# Patient Record
Sex: FEMALE | Race: ASIAN | NOT HISPANIC OR LATINO | Employment: OTHER | URBAN - METROPOLITAN AREA
[De-identification: names, ages, dates, MRNs, and addresses within clinical notes are randomized per-mention and may not be internally consistent; named-entity substitution may affect disease eponyms.]

---

## 2017-01-05 ENCOUNTER — TELEPHONE (OUTPATIENT)
Dept: PAIN MEDICINE | Facility: CLINIC | Age: 77
End: 2017-01-05

## 2017-01-06 ENCOUNTER — SURGERY (OUTPATIENT)
Age: 77
End: 2017-01-06

## 2017-01-06 ENCOUNTER — HOSPITAL ENCOUNTER (OUTPATIENT)
Dept: RADIOLOGY | Facility: HOSPITAL | Age: 77
Discharge: HOME | End: 2017-01-06
Attending: ANESTHESIOLOGY
Payer: MEDICARE

## 2017-01-06 DIAGNOSIS — M47.817 SPONDYLOSIS OF LUMBOSACRAL REGION WITHOUT MYELOPATHY OR RADICULOPATHY: ICD-10-CM

## 2017-01-06 PROCEDURE — 64495 INJ PARAVERT F JNT L/S 3 LEV: CPT | Mod: 50,TC

## 2017-01-06 PROCEDURE — 64494 INJ PARAVERT F JNT L/S 2 LEV: CPT | Mod: 50,TC

## 2017-01-06 PROCEDURE — 64493 INJ PARAVERT F JNT L/S 1 LEV: CPT | Mod: 50,TC

## 2017-01-06 RX ADMIN — LIDOCAINE HYDROCHLORIDE 10 ML: 20 INJECTION, SOLUTION INFILTRATION; PERINEURAL at 08:01

## 2017-01-06 RX ADMIN — METHYLPREDNISOLONE ACETATE 80 MG: 80 INJECTION, SUSPENSION INTRA-ARTICULAR; INTRALESIONAL; INTRAMUSCULAR; SOFT TISSUE at 08:01

## 2017-06-26 ENCOUNTER — OFFICE VISIT (OUTPATIENT)
Dept: INTERNAL MEDICINE | Facility: CLINIC | Age: 77
End: 2017-06-26
Payer: MEDICARE

## 2017-06-26 VITALS
SYSTOLIC BLOOD PRESSURE: 132 MMHG | DIASTOLIC BLOOD PRESSURE: 74 MMHG | BODY MASS INDEX: 23.19 KG/M2 | WEIGHT: 126 LBS | TEMPERATURE: 98 F | HEIGHT: 62 IN | OXYGEN SATURATION: 98 % | HEART RATE: 80 BPM

## 2017-06-26 DIAGNOSIS — R53.83 FATIGUE, UNSPECIFIED TYPE: ICD-10-CM

## 2017-06-26 DIAGNOSIS — J30.9 ALLERGIC RHINITIS, UNSPECIFIED ALLERGIC RHINITIS TRIGGER, UNSPECIFIED RHINITIS SEASONALITY: ICD-10-CM

## 2017-06-26 DIAGNOSIS — B34.9 VIRAL SYNDROME: Primary | ICD-10-CM

## 2017-06-26 LAB
ANION GAP SERPL CALC-SCNC: 7 MMOL/L
BASOPHILS # BLD AUTO: 0.02 K/UL
BASOPHILS NFR BLD: 0.5 %
BUN SERPL-MCNC: 20 MG/DL
CALCIUM SERPL-MCNC: 9.5 MG/DL
CHLORIDE SERPL-SCNC: 100 MMOL/L
CO2 SERPL-SCNC: 26 MMOL/L
CREAT SERPL-MCNC: 0.8 MG/DL
DIFFERENTIAL METHOD: ABNORMAL
EOSINOPHIL # BLD AUTO: 0.2 K/UL
EOSINOPHIL NFR BLD: 3.9 %
ERYTHROCYTE [DISTWIDTH] IN BLOOD BY AUTOMATED COUNT: 12.5 %
EST. GFR  (AFRICAN AMERICAN): >60 ML/MIN/1.73 M^2
EST. GFR  (NON AFRICAN AMERICAN): >60 ML/MIN/1.73 M^2
GLUCOSE SERPL-MCNC: 115 MG/DL
HCT VFR BLD AUTO: 35.2 %
HGB BLD-MCNC: 11.5 G/DL
LYMPHOCYTES # BLD AUTO: 1.3 K/UL
LYMPHOCYTES NFR BLD: 28.8 %
MCH RBC QN AUTO: 30.7 PG
MCHC RBC AUTO-ENTMCNC: 32.7 %
MCV RBC AUTO: 94 FL
MONOCYTES # BLD AUTO: 1 K/UL
MONOCYTES NFR BLD: 22 %
NEUTROPHILS # BLD AUTO: 2 K/UL
NEUTROPHILS NFR BLD: 44.6 %
PLATELET # BLD AUTO: 245 K/UL
PMV BLD AUTO: 9.7 FL
POTASSIUM SERPL-SCNC: 5 MMOL/L
RBC # BLD AUTO: 3.75 M/UL
SODIUM SERPL-SCNC: 133 MMOL/L
WBC # BLD AUTO: 4.37 K/UL

## 2017-06-26 PROCEDURE — 99213 OFFICE O/P EST LOW 20 MIN: CPT | Mod: S$PBB,,, | Performed by: FAMILY MEDICINE

## 2017-06-26 PROCEDURE — 1125F AMNT PAIN NOTED PAIN PRSNT: CPT | Mod: ,,, | Performed by: FAMILY MEDICINE

## 2017-06-26 PROCEDURE — 99999 PR PBB SHADOW E&M-EST. PATIENT-LVL IV: CPT | Mod: PBBFAC,,, | Performed by: FAMILY MEDICINE

## 2017-06-26 PROCEDURE — 1159F MED LIST DOCD IN RCRD: CPT | Mod: ,,, | Performed by: FAMILY MEDICINE

## 2017-06-26 PROCEDURE — 99214 OFFICE O/P EST MOD 30 MIN: CPT | Mod: PBBFAC,PO | Performed by: FAMILY MEDICINE

## 2017-06-26 PROCEDURE — 80048 BASIC METABOLIC PNL TOTAL CA: CPT

## 2017-06-26 PROCEDURE — 85025 COMPLETE CBC W/AUTO DIFF WBC: CPT

## 2017-06-26 RX ORDER — VALSARTAN AND HYDROCHLOROTHIAZIDE 160; 12.5 MG/1; MG/1
1 TABLET, FILM COATED ORAL DAILY
COMMUNITY
Start: 2017-06-20 | End: 2018-01-09 | Stop reason: SINTOL

## 2017-06-26 RX ORDER — OMEPRAZOLE 20 MG/1
20 CAPSULE, DELAYED RELEASE ORAL DAILY
COMMUNITY
Start: 2017-06-09 | End: 2018-04-02

## 2017-06-26 RX ORDER — FLUTICASONE PROPIONATE 50 MCG
2 SPRAY, SUSPENSION (ML) NASAL DAILY
Qty: 1 BOTTLE | Refills: 5 | Status: SHIPPED | OUTPATIENT
Start: 2017-06-26

## 2017-06-26 RX ORDER — LOVASTATIN 10 MG/1
10 TABLET ORAL NIGHTLY
COMMUNITY
Start: 2017-04-11

## 2017-06-26 RX ORDER — MELOXICAM 7.5 MG/1
7.5 TABLET ORAL
COMMUNITY
Start: 2017-06-09

## 2017-06-26 RX ORDER — CIPROFLOXACIN 500 MG/1
TABLET ORAL
COMMUNITY
Start: 2017-06-15 | End: 2017-12-04 | Stop reason: ALTCHOICE

## 2017-06-26 NOTE — PATIENT INSTRUCTIONS
"  H?i Ch?ng Do Vi Rút (Ng??i L?n)  M?t c?n b?nh do vi rút có th? gây ra m?t s? tri?u ch?ng. Các tri?u ch?ng ph? thu?c vào ph?n nào c?a c? th? b? ?nh h??ng b?i vi rút. N?u nó gây ?nh h??ng t?i m?i, h?ng, và ph?i, nó có th? gây ho, ?au h?ng, ngh?t m?i, và ?ôi khi nh?c ??u. N?u nó ?nh h??ng t?i donna t? và ???ng ru?t, nó có th? gây ói m?a và tiêu ch?y. ?ôi khi nó gây ra các tri?u ch?ng m? h? nh? "?au nh?c toàn thân," c?m th?y m?t m?i, không mu?n ?n u?ng gì, ho?c lên c?n s?t.  B?nh do vi rút th??ng kéo dài t? 1 t?i 2 tu?n, nh?ng ?ôi khi lâu h?n. Marli m?t s? tr??ng h?p, rober?m trùng nghiêm tr?ng h?n có th? gi?ng nh? m?t h?i ch?ng do vi rút marli m?t vài ngày ??u c?a c?n b?nh. Quý v? có th? c?n ?i khám l?i và làm thêm các th? albert?m ?? bi?t s? khác bi?t. Sidney dõi các d?u hi?u c?nh báo nh? ???c nêu d??i ?ây.  Ch?m sóc t?i yovanny  Làm sidney các h??ng d?n indiana ?ây ?? t? ch?m sóc cho b?n thân quý v? t?i nhà:  · N?u các tri?u ch?ng b? nghiêm tr?ng, hãy ngh? ng?i t?i nhà marli t? 2 t?i 3 ngày ??u.  · Tránh xa khói thu?c lá - c? khói thu?c lá c?a quý v? và khói thu?c lá c?a nh?ng ng??i khác.  · Quý v? có th? dùng thu?c paul t? do ngoài qu?y nh? acetaminophen ho?c ibuprofen ?? tr? c?n s?t, ?au nh?c b?p th?t, và nh?c ??u, tr? khi m?t lo?i thu?c khác ?ã ???c kê toa cho nh?ng ch?ng này. N?u quý v? b? b?nh meliza ho?c th?n mãn tính ho?c ?ã t?ng b? loét donna t? ho?c ch?y máu marli ???ng tiêu hoá, hãy bàn v?i bác s? c?a quý v? tr??c khi dùng các thu?c này. Không ng??i nào d??i 18 tu?i và b? b?nh lên c?n s?t ???c dùng aspirin. Nó có th? gây ra m?t c?n b?nh nghiêm tr?ng ho?c t?n h?i meliza ch?t ng??i.  · S? thèm ?n c?a quý v? có th? kém ?i, vì th? m?t ch? ?? ?n nh? c?ng t?t. Tránh ?? b? háo n??c b?ng cách u?ng t? 8 t?i 12 ly 8 ao s? ch?t l?ng m?i ngày. Ch?t l?ng này có th? donna g?m; n??c cam; n??c placido; táo, nho, và n??c ép qu? nam vi?t qu?t; n??c trái cây marli ramos?t; n??c u?ng thay cho ch?t ?i?n phân và n??c u?ng th? rio; và trà và cà phê ?ã ???c kh? " ch?t cà phê in. N?u quý v? ?ã ???c ch?n ?oán là b? b?nh th?n, hãy h?i bác s? c?a mình xem donna nhiêu ch?t l?ng và lo?i ch?t l?ng nào mà quý v? nên u?ng ?? ng?n ng?a ch?ng háo n??c. N?u quý v? b? b?nh th?n, vi?c u?ng quá rober?u ch?t l?ng có th? khi?n cho nó tích t? marli c? th? c?a quý v? và nguy hi?m t?i s?c kody? c?a mình.  · Các ph??ng thu?c paul t? do ngoài qu?y không rút ng?n ???c kody?ng th?i kristen c?a c?n b?nh nh?ng có th? h?u ích cho ch?ng ho, ?au c? h?ng; và ngh?t m?i và xoang m?i. Không dùng thu?c ch?ng ngh?t m?i n?u quý v? b? áp mary?t montgomery.  Ch?m sóc byron dõi  Khám byron dõi v?i nhân viên y t? c?a quý v? n?u quý v? không ?? h?n marli tu?n l? k? ti?p.  Khi nào ?i tìm s? khuyên nh? v? y t?  G?i nhân viên y t? ngay n?u quý v? b? b?t c? nh?ng ?i?u nào indiana ?ây:  · Ho có th?t rober?u ??m (ch?t nhày) có màu ho?c máu marli ??m  · ?au ng?c, th? d?c, th? khò khè, ho?c khó th?  · Nh?c ??u n?ng, ?au n?i m?t, c?, ho?c jose  · ?au nghiêm tr?ng, th??ng xuyên ? vùng b?ng bên ph?i phía d??i (b?ng)  · Ói m?a liên t?c (không th? gi? cho ch?t l?ng kh?i trào ra ???c)  · Tiêu ch?y th??ng xuyên (rober?u h?n 5 l?n m?t ngày); tiêu ch?y có máu (màu ?? ho?c ?en) ho?c ch?t nhày marli ?ó  · C?m th?y y?u s?c, chóng m?t, ho?c gi?ng nh? quý v? mu?n x?u  · Khát n??c cùng c?c  · S?t t? 100.4°F (38°C) tr? lên, ho?c byron ch? d?n c?a nhân viên y t?  G?i s? 911  ?i tìm s? ch?m sóc y t? kh?n c?p cho b?t c? ?i?u nào indiana ?ây x?y ra:  · Co gi?t  · C?m th?y y?u s?c, chóng m?t, ho?c gi?ng nh? quý v? mu?n x?u  · ?au ng?c, th? d?c, th? khò khè, ho?c khó th?  Date Last Reviewed: 9/25/2015  © 8561-9005 [email or web address]. [address] [place] [address], [place] [address]. All rights reserved. This information is not intended as a substitute for professional medical care. Always follow your healthcare professional's instructions.

## 2017-06-26 NOTE — PROGRESS NOTES
Subjective:       Patient ID: Carol Arias is a 77 y.o. female.    Chief Complaint: Sore Throat and Generalized Body Aches    She is here with her daughter Delphine who acts as . She has been feeling bad for a week.  She has had a series of symptoms including sore throat.  However, she is here not because of a sore throat but because she feels weak and achy all over.  She does care for a grandchild who did have some kind of virus a week ago.  She is treated for DM, HTN, HLD by Dr EDWARDO Montenegro.      Sore Throat    This is a new problem. The current episode started in the past 7 days. The problem has been waxing and waning. There has been no fever. The pain is at a severity of 6/10. The pain is moderate. Associated symptoms include congestion, coughing (little bit) and headaches. Pertinent negatives include no diarrhea, ear pain, shortness of breath, trouble swallowing or vomiting. Associated symptoms comments: Sneezing, tired, aching. She has had no exposure to strep or mono. She has tried acetaminophen for the symptoms.     Review of Systems   HENT: Positive for congestion and sore throat. Negative for ear pain and trouble swallowing.    Respiratory: Positive for cough (little bit). Negative for shortness of breath.    Gastrointestinal: Negative for diarrhea and vomiting.   Neurological: Positive for headaches.       Objective:      Physical Exam   Constitutional: She is oriented to person, place, and time. She appears well-developed and well-nourished.   HENT:   Head: Normocephalic and atraumatic.   Right Ear: Tympanic membrane, external ear and ear canal normal.   Left Ear: Tympanic membrane, external ear and ear canal normal.   Nose: Nose normal.   Mouth/Throat: Oropharynx is clear and moist. No oropharyngeal exudate.   Eyes: Conjunctivae and EOM are normal.   Neck: Neck supple. No thyromegaly present.   Cardiovascular: Normal rate, regular rhythm and normal heart sounds.    Pulmonary/Chest: Effort normal and  breath sounds normal.   Lymphadenopathy:     She has no cervical adenopathy.   Neurological: She is alert and oriented to person, place, and time.   Skin: Skin is warm and dry.   Psychiatric: She has a normal mood and affect. Her behavior is normal.         Assessment/Plan:     1. Viral syndrome     2. Allergic rhinitis, unspecified allergic rhinitis trigger, unspecified rhinitis seasonality  fluticasone (FLONASE) 50 mcg/actuation nasal spray   3. Fatigue, unspecified type  CBC auto differential    Basic metabolic panel

## 2017-12-04 ENCOUNTER — OFFICE VISIT (OUTPATIENT)
Dept: INTERNAL MEDICINE | Facility: CLINIC | Age: 77
End: 2017-12-04
Payer: MEDICARE

## 2017-12-04 ENCOUNTER — APPOINTMENT (OUTPATIENT)
Dept: RADIOLOGY | Facility: HOSPITAL | Age: 77
End: 2017-12-04
Attending: FAMILY MEDICINE
Payer: MEDICARE

## 2017-12-04 VITALS
HEIGHT: 63 IN | SYSTOLIC BLOOD PRESSURE: 130 MMHG | OXYGEN SATURATION: 99 % | HEART RATE: 74 BPM | BODY MASS INDEX: 21.21 KG/M2 | TEMPERATURE: 98 F | WEIGHT: 119.69 LBS | DIASTOLIC BLOOD PRESSURE: 71 MMHG

## 2017-12-04 DIAGNOSIS — R05.3 PERSISTENT COUGH FOR 3 WEEKS OR LONGER: ICD-10-CM

## 2017-12-04 DIAGNOSIS — R05.3 PERSISTENT COUGH FOR 3 WEEKS OR LONGER: Primary | ICD-10-CM

## 2017-12-04 PROCEDURE — 99213 OFFICE O/P EST LOW 20 MIN: CPT | Mod: PBBFAC,PO | Performed by: FAMILY MEDICINE

## 2017-12-04 PROCEDURE — 99999 PR PBB SHADOW E&M-EST. PATIENT-LVL III: CPT | Mod: PBBFAC,,, | Performed by: FAMILY MEDICINE

## 2017-12-04 PROCEDURE — 71020 XR CHEST PA AND LATERAL: CPT | Mod: TC,PO

## 2017-12-04 PROCEDURE — 99213 OFFICE O/P EST LOW 20 MIN: CPT | Mod: S$PBB,,, | Performed by: FAMILY MEDICINE

## 2017-12-04 PROCEDURE — 71020 XR CHEST PA AND LATERAL: CPT | Mod: 26,,, | Performed by: RADIOLOGY

## 2017-12-04 RX ORDER — PREDNISONE 10 MG/1
10 TABLET ORAL
COMMUNITY
End: 2017-12-19

## 2017-12-04 RX ORDER — PROMETHAZINE HYDROCHLORIDE AND CODEINE PHOSPHATE 6.25; 1 MG/5ML; MG/5ML
5 SOLUTION ORAL EVERY 4 HOURS PRN
COMMUNITY
End: 2017-12-19

## 2017-12-04 RX ORDER — BENZONATATE 200 MG/1
200 CAPSULE ORAL 3 TIMES DAILY PRN
Qty: 30 CAPSULE | Refills: 0 | Status: SHIPPED | OUTPATIENT
Start: 2017-12-04 | End: 2018-04-02

## 2017-12-04 RX ORDER — LEVOFLOXACIN 500 MG/1
TABLET, FILM COATED ORAL
COMMUNITY
Start: 2017-10-04 | End: 2018-04-02 | Stop reason: ALTCHOICE

## 2017-12-04 RX ORDER — AZITHROMYCIN 250 MG/1
TABLET, FILM COATED ORAL
COMMUNITY
Start: 2017-11-13 | End: 2017-12-19

## 2017-12-04 RX ORDER — ALBUTEROL SULFATE 90 UG/1
AEROSOL, METERED RESPIRATORY (INHALATION)
COMMUNITY
Start: 2017-11-13 | End: 2018-04-02 | Stop reason: SDUPTHER

## 2017-12-04 RX ORDER — BENZONATATE 200 MG/1
200 CAPSULE ORAL 3 TIMES DAILY PRN
COMMUNITY
End: 2017-12-04 | Stop reason: SDUPTHER

## 2017-12-04 NOTE — PATIENT INSTRUCTIONS
Use albuterol 3 x day - every 4-6 hours.  Take the antibiotic azithromycin - it will cover pertussis (whooping cough)  Finish the prednisone course.      Step-by-Step  Using an Inhaler Without a Spacer       Date Last Reviewed: 2/1/2017  © 1689-6015 [email or web address]. [address] [place] [id number]. All rights reserved. This information is not intended as a substitute for professional medical care. Always follow your healthcare professional's instructions.

## 2017-12-04 NOTE — PROGRESS NOTES
Subjective:       Patient ID: Carol Arias is a 77 y.o. female.    Chief Complaint: Cough and Fatigue    She has been coughing for 6 weeks - she saw doctor twice - initially given a cough syrup. Then saw him again 11/13/17. She was given tessalon perles, phen/codeine, albuterol, flonase and prednisone 10 mg #20 one daily - still taking this. Here with daughter who sts she felt pt was febrile 3 days ago whren she checked on her - no CXR or labs performed. Last labs 3-4 months ago for DM per son whom I spoke with by phone. He also sts she was rxd Zpack but did not take. All other meds being taken.      Review of Systems   Constitutional: Negative for fever.   Respiratory: Positive for cough, chest tightness and shortness of breath (with cough only).    Cardiovascular: Positive for chest pain (with cough spasms).       Objective:      Physical Exam   Constitutional: She is oriented to person, place, and time. She appears well-developed and well-nourished.   HENT:   Head: Normocephalic and atraumatic.   Right Ear: Tympanic membrane, external ear and ear canal normal.   Left Ear: Tympanic membrane, external ear and ear canal normal.   Nose: Nose normal.   Mouth/Throat: Oropharynx is clear and moist. No oropharyngeal exudate.   Eyes: Conjunctivae and EOM are normal.   Neck: Neck supple. No thyromegaly present.   Cardiovascular: Normal rate, regular rhythm and normal heart sounds.    Pulmonary/Chest: Effort normal and breath sounds normal.   Lymphadenopathy:     She has no cervical adenopathy.   Neurological: She is alert and oriented to person, place, and time.   Skin: Skin is warm and dry.   Psychiatric: She has a normal mood and affect. Her behavior is normal.         Assessment/Plan:     1. Persistent cough for 3 weeks or longer  X-Ray Chest PA And Lateral    benzonatate (TESSALON) 200 MG capsule   she is to take the Zpack - will check xray. Continue flonase and albuterol as directed, finish pred course.  Pulmonary if  no improvement.  Reviewed MDI delivery with pt / daughter.

## 2017-12-05 ENCOUNTER — TELEPHONE (OUTPATIENT)
Dept: INTERNAL MEDICINE | Facility: CLINIC | Age: 77
End: 2017-12-05

## 2017-12-05 DIAGNOSIS — J18.9 PNEUMONIA OF LEFT LOWER LOBE DUE TO INFECTIOUS ORGANISM: Primary | ICD-10-CM

## 2017-12-05 RX ORDER — AMOXICILLIN 500 MG/1
1000 CAPSULE ORAL EVERY 12 HOURS
Qty: 20 CAPSULE | Refills: 0 | Status: SHIPPED | OUTPATIENT
Start: 2017-12-05 | End: 2017-12-11

## 2017-12-05 RX ORDER — DOXYCYCLINE 100 MG/1
100 CAPSULE ORAL 2 TIMES DAILY
Qty: 14 CAPSULE | Refills: 0 | Status: SHIPPED | OUTPATIENT
Start: 2017-12-05 | End: 2017-12-11 | Stop reason: SDUPTHER

## 2017-12-05 NOTE — TELEPHONE ENCOUNTER
Son told me on the phone that she had a Z-Brant but had not taken it.  I am asking her to take it.  I did not call in any abx because I understood that she had not taken the Zpack yet.    Pls speak with the son to clarify. If she has already taken the Zpack rxd at her last visit with the other doctor, then I will send a different abx.

## 2017-12-05 NOTE — TELEPHONE ENCOUNTER
Spoke with the son - she was rxd Zpack 11/13 and did not take it right away but she did take it last week and completed it. She also had a course of cipro rxd in the past and took it more recently as well - not sure when however.    Sending abs now - informed son - rx for dual therapy 2/2 having failed course of Zpack. Son informed to be taken concurrently.

## 2017-12-05 NOTE — TELEPHONE ENCOUNTER
See chest x-ray results.  Let son know there is evidence of pneumonia.  She is to take her medication. Pls schedule her for a one-week recheck.

## 2017-12-05 NOTE — TELEPHONE ENCOUNTER
Please explain which medication she is to continue.  Son states that she already had a z-pack   Was a new on called in

## 2017-12-11 ENCOUNTER — TELEPHONE (OUTPATIENT)
Dept: INTERNAL MEDICINE | Facility: CLINIC | Age: 77
End: 2017-12-11

## 2017-12-11 ENCOUNTER — OFFICE VISIT (OUTPATIENT)
Dept: INTERNAL MEDICINE | Facility: CLINIC | Age: 77
End: 2017-12-11
Payer: MEDICARE

## 2017-12-11 VITALS
OXYGEN SATURATION: 99 % | WEIGHT: 125.63 LBS | SYSTOLIC BLOOD PRESSURE: 139 MMHG | TEMPERATURE: 98 F | HEIGHT: 62 IN | HEART RATE: 73 BPM | BODY MASS INDEX: 23.12 KG/M2 | DIASTOLIC BLOOD PRESSURE: 77 MMHG

## 2017-12-11 DIAGNOSIS — J18.9 PNEUMONIA OF LEFT LOWER LOBE DUE TO INFECTIOUS ORGANISM: Primary | ICD-10-CM

## 2017-12-11 PROCEDURE — 99213 OFFICE O/P EST LOW 20 MIN: CPT | Mod: PBBFAC,PO | Performed by: FAMILY MEDICINE

## 2017-12-11 PROCEDURE — 99999 PR PBB SHADOW E&M-EST. PATIENT-LVL III: CPT | Mod: PBBFAC,,, | Performed by: FAMILY MEDICINE

## 2017-12-11 PROCEDURE — 99213 OFFICE O/P EST LOW 20 MIN: CPT | Mod: S$PBB,,, | Performed by: FAMILY MEDICINE

## 2017-12-11 RX ORDER — DOXYCYCLINE 100 MG/1
100 CAPSULE ORAL EVERY 12 HOURS
Qty: 6 CAPSULE | Refills: 0 | Status: SHIPPED | OUTPATIENT
Start: 2017-12-11 | End: 2017-12-18

## 2017-12-11 NOTE — TELEPHONE ENCOUNTER
Returned the patient son phone call. He states that his mom needed to come in for her 1 week follow up. Pt son states that she is still coughing a lot. Pt is scheduled to come in on today for 3:30 pm. Pt son verbalized understanding of the information given.

## 2017-12-11 NOTE — TELEPHONE ENCOUNTER
----- Message from Gwen Hinds sent at 12/11/2017  3:06 PM CST -----  Contact: pt's son  He's calling to advise that pt may be 15 min late for appointment, please advise [phone]

## 2017-12-11 NOTE — PATIENT INSTRUCTIONS
Please finish the amoxicillin tonight.  Take the doxycycline one capsule every 12 hours.  Take with food.

## 2017-12-11 NOTE — PROGRESS NOTES
Subjective:       Patient ID: Carol Arias is a 77 y.o. female.    Chief Complaint: Cough    Here for recheck on LLL pneumonia by CXR - was only taking the doxy once daily - is just about finished with the 5 day course amoxicilin.  Cough is improving.  Having pain to her upper right back with coughing - movement.  Denies chest pain.      Review of Systems   Constitutional: Negative for fever.   Respiratory: Positive for cough. Negative for chest tightness and shortness of breath.    Cardiovascular: Negative for chest pain.   Musculoskeletal: Positive for myalgias.   Psychiatric/Behavioral: Positive for sleep disturbance.       Objective:      Physical Exam   Constitutional: She is oriented to person, place, and time. She appears well-developed and well-nourished.   HENT:   Head: Normocephalic and atraumatic.   Right Ear: Tympanic membrane, external ear and ear canal normal.   Left Ear: Tympanic membrane, external ear and ear canal normal.   Nose: Nose normal.   Mouth/Throat: Oropharynx is clear and moist. No oropharyngeal exudate.   Eyes: Conjunctivae and EOM are normal.   Neck: Neck supple. No thyromegaly present.   Cardiovascular: Normal rate, regular rhythm and normal heart sounds.    Pulmonary/Chest: Effort normal and breath sounds normal.   Lymphadenopathy:     She has no cervical adenopathy.   Neurological: She is alert and oriented to person, place, and time.   Skin: Skin is warm and dry.   Psychiatric: She has a normal mood and affect. Her behavior is normal.         Assessment/Plan:     1. Pneumonia of left lower lobe due to infectious organism  X-Ray Chest PA And Lateral    CBC auto differential    Basic metabolic panel    doxycycline (MONODOX) 100 MG capsule    she has a doxycycline left.  I'm giving her 6 more for a full twice a day 7 day course.  She will recheck in a week with CXR.

## 2017-12-11 NOTE — TELEPHONE ENCOUNTER
----- Message from Clayton Figueroa sent at 12/11/2017  2:21 PM CST -----  Contact: Pt Son/Shepard  Caller  Request a call from the nurse to get an apt in a week for the pt because pt was told to come back in a week, I offered caller next available and another provider and caller declined, please contact the caller at [phone]

## 2017-12-18 ENCOUNTER — APPOINTMENT (OUTPATIENT)
Dept: RADIOLOGY | Facility: HOSPITAL | Age: 77
End: 2017-12-18
Attending: FAMILY MEDICINE
Payer: MEDICARE

## 2017-12-18 ENCOUNTER — CLINICAL SUPPORT (OUTPATIENT)
Dept: INTERNAL MEDICINE | Facility: CLINIC | Age: 77
End: 2017-12-18
Payer: MEDICARE

## 2017-12-18 DIAGNOSIS — J18.9 PNEUMONIA OF LEFT LOWER LOBE DUE TO INFECTIOUS ORGANISM: ICD-10-CM

## 2017-12-18 LAB
ANION GAP SERPL CALC-SCNC: 9 MMOL/L
BASOPHILS # BLD AUTO: 0.06 K/UL
BASOPHILS NFR BLD: 0.8 %
BUN SERPL-MCNC: 18 MG/DL
CALCIUM SERPL-MCNC: 9.6 MG/DL
CHLORIDE SERPL-SCNC: 97 MMOL/L
CO2 SERPL-SCNC: 26 MMOL/L
CREAT SERPL-MCNC: 0.8 MG/DL
DIFFERENTIAL METHOD: ABNORMAL
EOSINOPHIL # BLD AUTO: 0.2 K/UL
EOSINOPHIL NFR BLD: 2.8 %
ERYTHROCYTE [DISTWIDTH] IN BLOOD BY AUTOMATED COUNT: 12.6 %
EST. GFR  (AFRICAN AMERICAN): >60 ML/MIN/1.73 M^2
EST. GFR  (NON AFRICAN AMERICAN): >60 ML/MIN/1.73 M^2
GLUCOSE SERPL-MCNC: 146 MG/DL
HCT VFR BLD AUTO: 35.8 %
HGB BLD-MCNC: 11.8 G/DL
IMM GRANULOCYTES # BLD AUTO: 0.02 K/UL
IMM GRANULOCYTES NFR BLD AUTO: 0.3 %
LYMPHOCYTES # BLD AUTO: 1.7 K/UL
LYMPHOCYTES NFR BLD: 22.1 %
MCH RBC QN AUTO: 31.2 PG
MCHC RBC AUTO-ENTMCNC: 33 G/DL
MCV RBC AUTO: 95 FL
MONOCYTES # BLD AUTO: 1 K/UL
MONOCYTES NFR BLD: 12.5 %
NEUTROPHILS # BLD AUTO: 4.7 K/UL
NEUTROPHILS NFR BLD: 61.5 %
NRBC BLD-RTO: 0 /100 WBC
PLATELET # BLD AUTO: 264 K/UL
PMV BLD AUTO: 9.8 FL
POTASSIUM SERPL-SCNC: 5 MMOL/L
RBC # BLD AUTO: 3.78 M/UL
SODIUM SERPL-SCNC: 132 MMOL/L
WBC # BLD AUTO: 7.6 K/UL

## 2017-12-18 PROCEDURE — 85025 COMPLETE CBC W/AUTO DIFF WBC: CPT

## 2017-12-18 PROCEDURE — 71020 XR CHEST PA AND LATERAL: CPT | Mod: 26,,, | Performed by: RADIOLOGY

## 2017-12-18 PROCEDURE — 80048 BASIC METABOLIC PNL TOTAL CA: CPT

## 2017-12-18 PROCEDURE — 71020 XR CHEST PA AND LATERAL: CPT | Mod: TC,PO

## 2017-12-19 ENCOUNTER — OFFICE VISIT (OUTPATIENT)
Dept: INTERNAL MEDICINE | Facility: CLINIC | Age: 77
End: 2017-12-19
Payer: MEDICARE

## 2017-12-19 VITALS
WEIGHT: 124.25 LBS | OXYGEN SATURATION: 99 % | SYSTOLIC BLOOD PRESSURE: 127 MMHG | BODY MASS INDEX: 22.72 KG/M2 | HEART RATE: 81 BPM | DIASTOLIC BLOOD PRESSURE: 70 MMHG | TEMPERATURE: 99 F

## 2017-12-19 DIAGNOSIS — R93.89 ABNORMAL CXR (CHEST X-RAY): ICD-10-CM

## 2017-12-19 DIAGNOSIS — J18.9 PNEUMONIA OF LEFT LOWER LOBE DUE TO INFECTIOUS ORGANISM: Primary | ICD-10-CM

## 2017-12-19 PROCEDURE — 99999 PR PBB SHADOW E&M-EST. PATIENT-LVL III: CPT | Mod: PBBFAC,,, | Performed by: FAMILY MEDICINE

## 2017-12-19 PROCEDURE — 99212 OFFICE O/P EST SF 10 MIN: CPT | Mod: S$PBB,,, | Performed by: FAMILY MEDICINE

## 2017-12-19 PROCEDURE — 99213 OFFICE O/P EST LOW 20 MIN: CPT | Mod: PBBFAC,PO | Performed by: FAMILY MEDICINE

## 2017-12-19 RX ORDER — TRIAMCINOLONE ACETONIDE 1 MG/G
CREAM TOPICAL 2 TIMES DAILY
COMMUNITY

## 2017-12-19 NOTE — PROGRESS NOTES
Subjective:       Patient ID: Carol Arias is a 77 y.o. female.    Chief Complaint: discuss labs and x-ray    Here with her daughter for recheck LLL pneumonia with CXR yesterday comparison with 12/4/17 study shows little change. She is feeling somewhat better - improved appetite, coughing less severe - still can be productive.  No fever. Still gets tired easily but improving.  Labs reviewed. Non fasting  - she sees Dr Montenegro for DM2. Lab Results       Component                Value               Date                       WBC                      7.60                12/18/2017                 HGB                      11.8 (L)            12/18/2017                 HCT                      35.8 (L)            12/18/2017                 PLT                      264                 12/18/2017                 ALT                      18                  04/23/2015                 AST                      25                  04/23/2015                 NA                       132 (L)             12/18/2017                 K                        5.0                 12/18/2017                 CL                       97                  12/18/2017                 CALCIUM                  9.6                 12/18/2017                 CREATININE               0.8                 12/18/2017                 BUN                      18                  12/18/2017                 CO2                      26                  12/18/2017                 TSH                      0.492               04/03/2014                 INR                      1.0                 04/03/2014                 GLU                      146 (H)             12/18/2017                 ESTGFRAFRICA             >60.0               12/18/2017                 EGFRNONAA                >60.0               12/18/2017                 HGBA1C                   7.0 (H)             04/23/2015                  Review of Systems   Constitutional: Appetite change:  improved.   Respiratory: Positive for cough and chest tightness (improving).    Cardiovascular: Negative for chest pain and leg swelling.       Objective:      Physical Exam   Constitutional: She is oriented to person, place, and time. She appears well-developed.   HENT:   Head: Normocephalic and atraumatic.   Cardiovascular: Normal rate, regular rhythm and normal heart sounds.    Pulmonary/Chest: Effort normal and breath sounds normal.   Neurological: She is alert and oriented to person, place, and time.   Skin: Skin is warm and dry.   Psychiatric: She has a normal mood and affect. Her behavior is normal.         Assessment/Plan:     1. Pneumonia of left lower lobe due to infectious organism  X-Ray Chest PA And Lateral   2. Abnormal CXR (chest x-ray)  X-Ray Chest PA And Lateral   she just finished the full one week course doxy today. Will recheck with CXR in 4 weeks. iff no change will need further eval - labs for imflammatory items.

## 2018-01-02 ENCOUNTER — PATIENT OUTREACH (OUTPATIENT)
Dept: ADMINISTRATIVE | Facility: HOSPITAL | Age: 78
End: 2018-01-02

## 2018-01-02 NOTE — LETTER
January 2, 2018    Carol Shaniqua Arias  [address] [place] [address]             [facility]  [address]  [facility] [address]  Phone: [phone] Dear Ms. Arias:    [facility] is committed to your overall health.  To help you get the most out of each of your visits, we will review your information to make sure you are up to date on all of your recommended tests and/or procedures.      Maryellen Alexis MD has found that you may be due for   Health Maintenance Due   Topic    Eye Exam     TETANUS VACCINE     DEXA SCAN     Colonoscopy     Zoster Vaccine     Pneumococcal (65+) (1 of 2 - PCV13)    Foot Exam     Influenza Vaccine     Hemoglobin A1c         If you have had any of the above done at another facility, please bring the records or information with you so that your record at [facility] will be complete.    If you are currently taking medication, please bring it with you to your appointment for review.    We will be happy to assist you with scheduling any necessary appointments or you may contact the [facility] appointment desk at [phone] to schedule at your convenience.     Thank you for choosing [facility] for your healthcare needs,    Lisseth PIERRE LPN Care Coordinator  [facility]  [phone]

## 2018-01-02 NOTE — PROGRESS NOTES
Pre visit chart audit letter sent. Pending orders for lab, BMD, colonoscopy, and optometry placed.

## 2018-01-09 ENCOUNTER — HOSPITAL ENCOUNTER (EMERGENCY)
Facility: HOSPITAL | Age: 78
Discharge: HOME | End: 2018-01-09
Attending: SPECIALIST
Payer: MEDICARE

## 2018-01-09 VITALS
HEART RATE: 78 BPM | RESPIRATION RATE: 20 BRPM | OXYGEN SATURATION: 100 % | WEIGHT: 121.94 LBS | SYSTOLIC BLOOD PRESSURE: 159 MMHG | TEMPERATURE: 98 F | HEIGHT: 64 IN | DIASTOLIC BLOOD PRESSURE: 71 MMHG | BODY MASS INDEX: 20.82 KG/M2

## 2018-01-09 DIAGNOSIS — E86.0 DEHYDRATION: ICD-10-CM

## 2018-01-09 DIAGNOSIS — R53.83 FATIGUE, UNSPECIFIED TYPE: ICD-10-CM

## 2018-01-09 DIAGNOSIS — R53.1 WEAKNESS: Primary | ICD-10-CM

## 2018-01-09 LAB
ALBUMIN SERPL BCP-MCNC: 3.6 G/DL
ALP SERPL-CCNC: 47 U/L
ALT SERPL W/O P-5'-P-CCNC: 15 U/L
ANION GAP SERPL CALC-SCNC: 8 MMOL/L
APTT BLDCRRT: 26 SEC
AST SERPL-CCNC: 38 U/L
BASOPHILS # BLD AUTO: 0.01 K/UL
BASOPHILS NFR BLD: 0.2 %
BILIRUB SERPL-MCNC: 0.2 MG/DL
BILIRUB UR QL STRIP: NEGATIVE
BNP SERPL-MCNC: <10 PG/ML
BUN SERPL-MCNC: 13 MG/DL
CALCIUM SERPL-MCNC: 8.7 MG/DL
CHLORIDE SERPL-SCNC: 99 MMOL/L
CLARITY UR: CLEAR
CO2 SERPL-SCNC: 25 MMOL/L
COLOR UR: YELLOW
CREAT SERPL-MCNC: 0.7 MG/DL
DIFFERENTIAL METHOD: ABNORMAL
EOSINOPHIL # BLD AUTO: 0.1 K/UL
EOSINOPHIL NFR BLD: 2.1 %
ERYTHROCYTE [DISTWIDTH] IN BLOOD BY AUTOMATED COUNT: 13.1 %
EST. GFR  (AFRICAN AMERICAN): >60 ML/MIN/1.73 M^2
EST. GFR  (NON AFRICAN AMERICAN): >60 ML/MIN/1.73 M^2
FLUAV AG SPEC QL IA: NEGATIVE
FLUBV AG SPEC QL IA: NEGATIVE
GLUCOSE SERPL-MCNC: 89 MG/DL
GLUCOSE UR QL STRIP: ABNORMAL
HCT VFR BLD AUTO: 36.2 %
HGB BLD-MCNC: 12.1 G/DL
HGB UR QL STRIP: NEGATIVE
INR PPP: 1
KETONES UR QL STRIP: NEGATIVE
LEUKOCYTE ESTERASE UR QL STRIP: NEGATIVE
LYMPHOCYTES # BLD AUTO: 1.7 K/UL
LYMPHOCYTES NFR BLD: 27.3 %
MAGNESIUM SERPL-MCNC: 2.9 MG/DL
MCH RBC QN AUTO: 30.9 PG
MCHC RBC AUTO-ENTMCNC: 33.4 G/DL
MCV RBC AUTO: 92 FL
MONOCYTES # BLD AUTO: 1 K/UL
MONOCYTES NFR BLD: 16.4 %
NEUTROPHILS # BLD AUTO: 3.4 K/UL
NEUTROPHILS NFR BLD: 54 %
NITRITE UR QL STRIP: NEGATIVE
PH UR STRIP: 6 [PH] (ref 5–8)
PLATELET # BLD AUTO: 250 K/UL
PMV BLD AUTO: 8.9 FL
POCT GLUCOSE: 126 MG/DL (ref 70–110)
POTASSIUM SERPL-SCNC: 5.2 MMOL/L
PROT SERPL-MCNC: 8 G/DL
PROT UR QL STRIP: NEGATIVE
PROTHROMBIN TIME: 10.1 SEC
RBC # BLD AUTO: 3.92 M/UL
SODIUM SERPL-SCNC: 132 MMOL/L
SP GR UR STRIP: 1.02 (ref 1–1.03)
SPECIMEN SOURCE: NORMAL
TROPONIN I SERPL DL<=0.01 NG/ML-MCNC: <0.006 NG/ML
TSH SERPL DL<=0.005 MIU/L-ACNC: 0.42 UIU/ML
URN SPEC COLLECT METH UR: ABNORMAL
UROBILINOGEN UR STRIP-ACNC: NEGATIVE EU/DL
WBC # BLD AUTO: 6.22 K/UL

## 2018-01-09 PROCEDURE — 85730 THROMBOPLASTIN TIME PARTIAL: CPT

## 2018-01-09 PROCEDURE — 87088 URINE BACTERIA CULTURE: CPT

## 2018-01-09 PROCEDURE — 87186 SC STD MICRODIL/AGAR DIL: CPT

## 2018-01-09 PROCEDURE — 80053 COMPREHEN METABOLIC PANEL: CPT

## 2018-01-09 PROCEDURE — 84443 ASSAY THYROID STIM HORMONE: CPT

## 2018-01-09 PROCEDURE — 83735 ASSAY OF MAGNESIUM: CPT

## 2018-01-09 PROCEDURE — 82962 GLUCOSE BLOOD TEST: CPT

## 2018-01-09 PROCEDURE — 85025 COMPLETE CBC W/AUTO DIFF WBC: CPT

## 2018-01-09 PROCEDURE — 85610 PROTHROMBIN TIME: CPT

## 2018-01-09 PROCEDURE — 93010 ELECTROCARDIOGRAM REPORT: CPT | Mod: ,,, | Performed by: INTERNAL MEDICINE

## 2018-01-09 PROCEDURE — 87086 URINE CULTURE/COLONY COUNT: CPT

## 2018-01-09 PROCEDURE — 96360 HYDRATION IV INFUSION INIT: CPT

## 2018-01-09 PROCEDURE — 86480 TB TEST CELL IMMUN MEASURE: CPT

## 2018-01-09 PROCEDURE — 93005 ELECTROCARDIOGRAM TRACING: CPT

## 2018-01-09 PROCEDURE — 87077 CULTURE AEROBIC IDENTIFY: CPT

## 2018-01-09 PROCEDURE — 83880 ASSAY OF NATRIURETIC PEPTIDE: CPT

## 2018-01-09 PROCEDURE — 81003 URINALYSIS AUTO W/O SCOPE: CPT

## 2018-01-09 PROCEDURE — 84484 ASSAY OF TROPONIN QUANT: CPT

## 2018-01-09 PROCEDURE — 25500020 PHARM REV CODE 255: Performed by: SPECIALIST

## 2018-01-09 PROCEDURE — 25000003 PHARM REV CODE 250: Performed by: SPECIALIST

## 2018-01-09 PROCEDURE — 87400 INFLUENZA A/B EACH AG IA: CPT

## 2018-01-09 PROCEDURE — 99284 EMERGENCY DEPT VISIT MOD MDM: CPT | Mod: 25

## 2018-01-09 PROCEDURE — 36415 COLL VENOUS BLD VENIPUNCTURE: CPT

## 2018-01-09 RX ORDER — BENZONATATE 100 MG/1
100 CAPSULE ORAL 3 TIMES DAILY PRN
COMMUNITY
End: 2018-04-02

## 2018-01-09 RX ORDER — LEVOFLOXACIN 750 MG/1
750 TABLET ORAL DAILY
COMMUNITY
End: 2018-04-02 | Stop reason: ALTCHOICE

## 2018-01-09 RX ORDER — PROMETHAZINE HYDROCHLORIDE AND CODEINE PHOSPHATE 6.25; 1 MG/5ML; MG/5ML
5 SOLUTION ORAL 2 TIMES DAILY
COMMUNITY
End: 2018-04-02

## 2018-01-09 RX ADMIN — SODIUM CHLORIDE 1000 ML: 0.9 INJECTION, SOLUTION INTRAVENOUS at 03:01

## 2018-01-09 RX ADMIN — IOHEXOL 75 ML: 350 INJECTION, SOLUTION INTRAVENOUS at 05:01

## 2018-01-09 NOTE — ED PROVIDER NOTES
SCRIBE #1 NOTE: I, Bonnie Calderon, am scribing for, and in the presence of, Dena Barnard MD. I have scribed the entire note.      History      Chief Complaint   Patient presents with    Fatigue     increased weakness and fatigue x 2 days, cough reported       Review of patient's allergies indicates:   Allergen Reactions    B12 [cyanocobalamin-cobamamide]         HPI   HPI    1/9/2018, 12:27 PM   History obtained from the daughter      History of Present Illness: Carol Arias is a 77 y.o. female patient with HTN and DM who presents to the Emergency Department for fatigue which onset gradually 2 days ago. Symptoms are constant and moderate in severity. Daughter reports that patient has been having a cough for a few months. She states that patient is not having any pain but just feels tired. Associated sx include subjective fever. No mitigating or exacerbating factors reported. Patient denies chills, CP, SOB, abd pain, N/V/D, myalgias, HA, dysuria, hematuria, and all other sxs at this time. No further complaints or concerns at this time.       Arrival mode: Personal vehicle    PCP: Maryellen Alexis MD       Past Medical History:  Past Medical History:   Diagnosis Date    Diabetes mellitus, type 2     Hypertension        Past Surgical History:  History reviewed. No pertinent surgical history.      Family History:  Family History   Problem Relation Age of Onset    No Known Problems Mother     No Known Problems Father        Social History:  Social History     Social History Main Topics    Smoking status: Never Smoker    Smokeless tobacco: Never Used    Alcohol use No    Drug use: No    Sexual activity: Not Currently     Partners: Male       ROS   Review of Systems   Constitutional: Positive for fatigue and fever (subjective). Negative for chills.   HENT: Negative for sore throat.    Respiratory: Positive for cough. Negative for shortness of breath.    Cardiovascular: Negative for chest pain and leg  "swelling.   Gastrointestinal: Negative for abdominal pain, diarrhea, nausea and vomiting.   Genitourinary: Negative for dysuria and hematuria.   Musculoskeletal: Negative for back pain and myalgias.   Skin: Negative for rash.   Neurological: Negative for dizziness, weakness, light-headedness, numbness and headaches.   Hematological: Does not bruise/bleed easily.   All other systems reviewed and are negative.      Physical Exam      Initial Vitals [01/09/18 1028]   BP Pulse Resp Temp SpO2   (!) 148/73 86 18 97.9 °F (36.6 °C) 100 %      MAP       98          Physical Exam  Nursing Notes and Vital Signs Reviewed.  Constitutional: Patient is in no acute distress. Awake and alert. Well-developed and well-nourished.  Head: Atraumatic. Normocephalic.  Eyes: PERRL. EOM intact. Conjunctivae are not pale. No scleral icterus.  ENT: Mucous membranes are slightly dry. Oropharynx is clear and symmetric.    Neck: Supple. Full ROM. No lymphadenopathy.  Cardiovascular: Regular rate. Regular rhythm. No murmurs, rubs, or gallops. Distal pulses are 2+ and symmetric.  Pulmonary/Chest: No respiratory distress. Clear to auscultation bilaterally. No wheezing, rales, or rhonchi.  Abdominal: Soft and non-distended.  There is no tenderness.  No rebound, guarding, or rigidity.  Good bowel sounds.    : No CVA TTP.  Musculoskeletal: Moves all extremities. No obvious deformities. No edema. No calf tenderness.  Skin: Warm and dry.  Neurological:  Alert, awake, and appropriate.  Normal speech.  No acute focal neurological deficits are appreciated.  Psychiatric: Normal affect. Good eye contact. Appropriate in content.    ED Course    Procedures  ED Vital Signs:  Vitals:    01/09/18 1028 01/09/18 1331   BP: (!) 148/73 131/63   Pulse: 86 75   Resp: 18 20   Temp: 97.9 °F (36.6 °C)    TempSrc: Oral    SpO2: 100% 100%   Weight: 55.3 kg (121 lb 14.6 oz)    Height: 5' 4" (1.626 m)        Abnormal Lab Results:  Labs Reviewed   CBC W/ AUTO DIFFERENTIAL - " Abnormal; Notable for the following:        Result Value    RBC 3.92 (*)     Hematocrit 36.2 (*)     MPV 8.9 (*)     Mono% 16.4 (*)     All other components within normal limits   COMPREHENSIVE METABOLIC PANEL - Abnormal; Notable for the following:     Sodium 132 (*)     Potassium 5.2 (*)     Alkaline Phosphatase 47 (*)     All other components within normal limits   MAGNESIUM - Abnormal; Notable for the following:     Magnesium 2.9 (*)     All other components within normal limits   URINALYSIS - Abnormal; Notable for the following:     Glucose, UA Trace (*)     All other components within normal limits   TROPONIN I   PROTIME-INR   APTT   B-TYPE NATRIURETIC PEPTIDE   TSH   TSH   INFLUENZA A AND B ANTIGEN   QUANTIFERON GOLD TB        All Lab Results:  Results for orders placed or performed during the hospital encounter of 01/09/18   CBC auto differential   Result Value Ref Range    WBC 6.22 3.90 - 12.70 K/uL    RBC 3.92 (L) 4.00 - 5.40 M/uL    Hemoglobin 12.1 12.0 - 16.0 g/dL    Hematocrit 36.2 (L) 37.0 - 48.5 %    MCV 92 82 - 98 fL    MCH 30.9 27.0 - 31.0 pg    MCHC 33.4 32.0 - 36.0 g/dL    RDW 13.1 11.5 - 14.5 %    Platelets 250 150 - 350 K/uL    MPV 8.9 (L) 9.2 - 12.9 fL    Gran # 3.4 1.8 - 7.7 K/uL    Lymph # 1.7 1.0 - 4.8 K/uL    Mono # 1.0 0.3 - 1.0 K/uL    Eos # 0.1 0.0 - 0.5 K/uL    Baso # 0.01 0.00 - 0.20 K/uL    Gran% 54.0 38.0 - 73.0 %    Lymph% 27.3 18.0 - 48.0 %    Mono% 16.4 (H) 4.0 - 15.0 %    Eosinophil% 2.1 0.0 - 8.0 %    Basophil% 0.2 0.0 - 1.9 %    Differential Method Automated    Comprehensive metabolic panel   Result Value Ref Range    Sodium 132 (L) 136 - 145 mmol/L    Potassium 5.2 (H) 3.5 - 5.1 mmol/L    Chloride 99 95 - 110 mmol/L    CO2 25 23 - 29 mmol/L    Glucose 89 70 - 110 mg/dL    BUN, Bld 13 8 - 23 mg/dL    Creatinine 0.7 0.5 - 1.4 mg/dL    Calcium 8.7 8.7 - 10.5 mg/dL    Total Protein 8.0 6.0 - 8.4 g/dL    Albumin 3.6 3.5 - 5.2 g/dL    Total Bilirubin 0.2 0.1 - 1.0 mg/dL    Alkaline  Phosphatase 47 (L) 55 - 135 U/L    AST 38 10 - 40 U/L    ALT 15 10 - 44 U/L    Anion Gap 8 8 - 16 mmol/L    eGFR if African American >60 >60 mL/min/1.73 m^2    eGFR if non African American >60 >60 mL/min/1.73 m^2   Magnesium   Result Value Ref Range    Magnesium 2.9 (H) 1.6 - 2.6 mg/dL   Troponin I   Result Value Ref Range    Troponin I <0.006 0.000 - 0.026 ng/mL   Protime-INR   Result Value Ref Range    Prothrombin Time 10.1 9.0 - 12.5 sec    INR 1.0 0.8 - 1.2   APTT   Result Value Ref Range    aPTT 26.0 21.0 - 32.0 sec   Brain natriuretic peptide   Result Value Ref Range    BNP <10 0 - 99 pg/mL   Urinalysis   Result Value Ref Range    Specimen UA Urine, Clean Catch     Color, UA Yellow Yellow, Straw, Cathi    Appearance, UA Clear Clear    pH, UA 6.0 5.0 - 8.0    Specific Gravity, UA 1.025 1.005 - 1.030    Protein, UA Negative Negative    Glucose, UA Trace (A) Negative    Ketones, UA Negative Negative    Bilirubin (UA) Negative Negative    Occult Blood UA Negative Negative    Nitrite, UA Negative Negative    Urobilinogen, UA Negative <2.0 EU/dL    Leukocytes, UA Negative Negative   TSH   Result Value Ref Range    TSH 0.420 0.400 - 4.000 uIU/mL     Imaging Results:  Imaging Results          X-Ray Chest 1 View (Final result)  Result time 01/09/18 11:46:28    Final result by Tanner Kennedy MD (01/09/18 11:46:28)                 Impression:      No acute findings.      Electronically signed by: TANNER KENNEDY MD  Date:     01/09/18  Time:    11:46              Narrative:    Exam: XR CHEST 1 VIEW,    Date:  01/09/18 11:27:59    History: cough    Comparison:  12/18/2017    Findings: Cardiac size appears nonenlarged.  The lung volumes are decreased, similar to the previous study with bibasilar atelectasis.                             The EKG was ordered, reviewed, and independently interpreted by the ED provider.  Interpretation time: 12:39  Rate: 78 BPM  Rhythm: normal sinus rhythm  Interpretation: No acute ST  changes. No STEMI.    The Emergency Provider reviewed the vital signs and test results, which are outlined above.    ED Discussion   3:41 PM: Reassessed pt at this time. D/w patient and daughter imaging and lab results. Discussed pt dx and plan of tx. Gave pt all f/u and return to the ED instructions. All questions and concerns were addressed at this time. Pt expresses understanding of information and instructions, and is comfortable with plan to discharge. Pt is stable for discharge.    I discussed with patient and/or family/caretaker that evaluation in the ED does not suggest any emergent or life threatening medical conditions requiring immediate intervention beyond what was provided in the ED, and I believe patient is safe for discharge.  Regardless, an unremarkable evaluation in the ED does not preclude the development or presence of a serious of life threatening condition. As such, patient was instructed to return immediately for any worsening or change in current symptoms.    ED Medication(s):  Medications   sodium chloride 0.9% bolus 1,000 mL (1,000 mLs Intravenous New Bag 1/9/18 0206)       Current Discharge Medication List          Follow-up Information     Schedule an appointment as soon as possible for a visit  with Maryellen Alexis MD.    Specialty:  Family Medicine  Contact information:  [address] [place] [address]  [phone]             [facility] - .    Specialty:  Emergency Medicine  Why:  If symptoms worsen  Contact information:  [id number] [place] [id number] [phone]                  Medical Decision Making    Medical Decision Making:   Clinical Tests:   Lab Tests: Reviewed and Ordered  Radiological Study: Reviewed and Ordered  Medical Tests: Reviewed and Ordered           Scribe Attestation:   Scribe #1: I performed the above scribed service and the documentation accurately describes the services I performed. I attest to the accuracy  of the note.    Attending:   Physician Attestation Statement for Scribe #1: I, Dena Barnard MD, personally performed the services described in this documentation, as scribed by Bonnie Calderon, in my presence, and it is both accurate and complete.          Clinical Impression       ICD-10-CM ICD-9-CM   1. Weakness R53.1 780.79   2. Fatigue, unspecified type R53.83 780.79   3. Dehydration E86.0 276.51       Disposition:   Disposition: Discharged  Condition: Stable         Dena Barnard MD  01/09/18 9092

## 2018-01-12 LAB — BACTERIA UR CULT: NORMAL

## 2018-01-15 LAB
MITOGEN NIL: >10 IU/ML
NIL: 0.06 IU/ML
TB ANTIGEN NIL: 0.04 IU/ML
TB ANTIGEN: 0.1 IU/ML
TB GOLD: NEGATIVE

## 2018-02-19 ENCOUNTER — TELEPHONE (OUTPATIENT)
Dept: INTERNAL MEDICINE | Facility: CLINIC | Age: 78
End: 2018-02-19

## 2018-02-19 NOTE — TELEPHONE ENCOUNTER
----- Message from oJcelyn Gustafson sent at 2/19/2018  8:27 AM CST -----  Contact: son  Please call pt son @ [phone], need to discuss issues regarding pt.

## 2018-03-28 ENCOUNTER — TELEPHONE (OUTPATIENT)
Dept: INTERNAL MEDICINE | Facility: CLINIC | Age: 78
End: 2018-03-28

## 2018-03-28 NOTE — TELEPHONE ENCOUNTER
----- Message from Fidelina Clark sent at 3/28/2018  9:35 AM CDT -----  Contact: pt-son Drea Shepard requested a callback to discuss pt has been coughing for a month callback number is [phone]

## 2018-04-02 ENCOUNTER — OFFICE VISIT (OUTPATIENT)
Dept: INTERNAL MEDICINE | Facility: CLINIC | Age: 78
End: 2018-04-02
Payer: MEDICARE

## 2018-04-02 VITALS
BODY MASS INDEX: 20.36 KG/M2 | HEART RATE: 79 BPM | SYSTOLIC BLOOD PRESSURE: 131 MMHG | DIASTOLIC BLOOD PRESSURE: 80 MMHG | OXYGEN SATURATION: 98 % | TEMPERATURE: 98 F | WEIGHT: 118.63 LBS

## 2018-04-02 DIAGNOSIS — R05.3 PERSISTENT COUGH FOR 3 WEEKS OR LONGER: Primary | ICD-10-CM

## 2018-04-02 DIAGNOSIS — L30.8 PRURITIC DERMATITIS: ICD-10-CM

## 2018-04-02 DIAGNOSIS — I10 HYPERTENSION, ESSENTIAL: ICD-10-CM

## 2018-04-02 DIAGNOSIS — E11.69 HYPERLIPIDEMIA ASSOCIATED WITH TYPE 2 DIABETES MELLITUS: ICD-10-CM

## 2018-04-02 DIAGNOSIS — E11.42 TYPE 2 DIABETES MELLITUS WITH DIABETIC POLYNEUROPATHY, WITHOUT LONG-TERM CURRENT USE OF INSULIN: ICD-10-CM

## 2018-04-02 DIAGNOSIS — E78.5 HYPERLIPIDEMIA ASSOCIATED WITH TYPE 2 DIABETES MELLITUS: ICD-10-CM

## 2018-04-02 LAB
CHOLEST SERPL-MCNC: 178 MG/DL
CHOLEST/HDLC SERPL: 2.8 {RATIO}
ESTIMATED AVG GLUCOSE: 137 MG/DL
HBA1C MFR BLD HPLC: 6.4 %
HDLC SERPL-MCNC: 64 MG/DL
HDLC SERPL: 36 %
LDLC SERPL CALC-MCNC: 96 MG/DL
NONHDLC SERPL-MCNC: 114 MG/DL
TRIGL SERPL-MCNC: 90 MG/DL

## 2018-04-02 PROCEDURE — 99214 OFFICE O/P EST MOD 30 MIN: CPT | Mod: PBBFAC,PO | Performed by: FAMILY MEDICINE

## 2018-04-02 PROCEDURE — 83036 HEMOGLOBIN GLYCOSYLATED A1C: CPT

## 2018-04-02 PROCEDURE — 99214 OFFICE O/P EST MOD 30 MIN: CPT | Mod: S$PBB,,, | Performed by: FAMILY MEDICINE

## 2018-04-02 PROCEDURE — 82043 UR ALBUMIN QUANTITATIVE: CPT

## 2018-04-02 PROCEDURE — 80061 LIPID PANEL: CPT

## 2018-04-02 PROCEDURE — 99999 PR PBB SHADOW E&M-EST. PATIENT-LVL IV: CPT | Mod: PBBFAC,,, | Performed by: FAMILY MEDICINE

## 2018-04-02 RX ORDER — FLUOCINONIDE TOPICAL SOLUTION USP, 0.05% 0.5 MG/ML
SOLUTION TOPICAL 2 TIMES DAILY
Qty: 60 ML | Refills: 0 | Status: SHIPPED | OUTPATIENT
Start: 2018-04-02 | End: 2018-11-05

## 2018-04-02 RX ORDER — AMLODIPINE BESYLATE 5 MG/1
TABLET ORAL
Qty: 90 TABLET | Refills: 0 | Status: SHIPPED | OUTPATIENT
Start: 2018-04-02

## 2018-04-02 RX ORDER — ESOMEPRAZOLE MAGNESIUM 40 MG/1
40 GRANULE, DELAYED RELEASE ORAL
Qty: 30 EACH | Refills: 2 | Status: SHIPPED | OUTPATIENT
Start: 2018-04-02 | End: 2018-04-03 | Stop reason: CLARIF

## 2018-04-02 RX ORDER — ALBUTEROL SULFATE 90 UG/1
2 AEROSOL, METERED RESPIRATORY (INHALATION) EVERY 6 HOURS PRN
Qty: 18 G | Refills: 0 | Status: SHIPPED | OUTPATIENT
Start: 2018-04-02 | End: 2018-04-04 | Stop reason: CLARIF

## 2018-04-02 RX ORDER — VALSARTAN AND HYDROCHLOROTHIAZIDE 160; 12.5 MG/1; MG/1
TABLET, FILM COATED ORAL
COMMUNITY
Start: 2018-03-14 | End: 2018-04-02 | Stop reason: ALTCHOICE

## 2018-04-02 RX ORDER — AMLODIPINE BESYLATE 5 MG/1
5 TABLET ORAL DAILY
Qty: 30 TABLET | Refills: 1 | Status: SHIPPED | OUTPATIENT
Start: 2018-04-02 | End: 2018-04-02 | Stop reason: SDUPTHER

## 2018-04-02 NOTE — PATIENT INSTRUCTIONS
Step-by-Step  Using an Inhaler Without a Spacer       Date Last Reviewed: 2/1/2017  © 1108-0193 [employer]. [address] [place] [address], [place] [address]. All rights reserved. This information is not intended as a substitute for professional medical care. Always follow your healthcare professional's instructions.

## 2018-04-02 NOTE — PROGRESS NOTES
Subjective:       Patient ID: Carol Arias is a 78 y.o. female.    Chief Complaint: Cough (since 4-5 month ago)    She is here with her son c/o persistent cough, sometimes for 15 minutes. No coughing when lying down. Has some PND, lots of clearing throat all day.   She has a history of diabetes but her last A1c in our system was performed in 2015.  It was 7.0.  She is treated by another doctor for DM as well as for HLD.  However the family does not think she has had any blood work from him recently.  They would like to have her diabetes labs checked here today.  Also complaining of a chronic patch of darkened skin to her left lower leg.  She has been given medication for this in the past.  The lesion persists.      Review of Systems   Constitutional: Positive for chills and fever (subjunctive in afternoon, after lunch).   HENT: Positive for postnasal drip. Negative for congestion, rhinorrhea and sore throat.    Respiratory: Positive for cough and shortness of breath.    Gastrointestinal: Positive for abdominal pain (epigastric - started on nexium 40 dialy for last 2 days by son ) and diarrhea (occas). Negative for abdominal distention and constipation.   Neurological: Positive for headaches (after lunch - takes a nap, 2 tylenol and it resolves - it is 6/10, feels tired. ).   Psychiatric/Behavioral: Negative for sleep disturbance.       Objective:      Physical Exam   Constitutional: She is oriented to person, place, and time. She appears well-developed and well-nourished.   HENT:   Head: Normocephalic and atraumatic.   Right Ear: Tympanic membrane, external ear and ear canal normal.   Left Ear: Tympanic membrane, external ear and ear canal normal.   Nose: Nose normal.   Mouth/Throat: Oropharynx is clear and moist. No oropharyngeal exudate.   Eyes: Conjunctivae and EOM are normal.   Neck: Normal range of motion. Neck supple. No thyromegaly present.   Cardiovascular: Normal rate, regular rhythm and normal heart sounds.   Exam reveals no gallop and no friction rub.    No murmur heard.  Pulses:       Dorsalis pedis pulses are 2+ on the right side, and 2+ on the left side.        Posterior tibial pulses are 1+ on the right side, and 1+ on the left side.   Pulmonary/Chest: Effort normal and breath sounds normal. She exhibits no tenderness.   Abdominal: Soft. She exhibits no distension. There is no tenderness.   Musculoskeletal: She exhibits no edema.        Right foot: There is normal range of motion and no deformity.        Left foot: There is normal range of motion and no deformity.   Feet:   Right Foot:   Protective Sensation: 10 sites tested. 10 sites sensed.   Skin Integrity: Negative for ulcer or skin breakdown.   Left Foot:   Protective Sensation: 10 sites tested. 10 sites sensed.   Skin Integrity: Negative for ulcer or skin breakdown.   Lymphadenopathy:     She has no cervical adenopathy.   Neurological: She is alert and oriented to person, place, and time.   Skin: Skin is warm and dry.   Psychiatric: She has a normal mood and affect. Her behavior is normal.         Assessment/Plan:     1. Persistent cough for 3 weeks or longer  Ambulatory referral to Pulmonology    pantoprazole (PROTONIX) 40 MG tablet    DISCONTINUED: albuterol 90 mcg/actuation inhaler    DISCONTINUED: esomeprazole (NEXIUM) 40 mg GrPS   2. Pruritic dermatitis  Ambulatory referral to Dermatology    fluocinonide (LIDEX) 0.05 % external solution   3. Type 2 diabetes mellitus with diabetic polyneuropathy, without long-term current use of insulin  Hemoglobin A1c    Microalbumin/creatinine urine ratio   4. Hyperlipidemia associated with type 2 diabetes mellitus  Lipid panel   5. Hypertension, essential  DISCONTINUED: amLODIPine (NORVASC) 5 MG tablet

## 2018-04-03 ENCOUNTER — TELEPHONE (OUTPATIENT)
Dept: INTERNAL MEDICINE | Facility: CLINIC | Age: 78
End: 2018-04-03

## 2018-04-03 LAB
CREAT UR-MCNC: 74 MG/DL
MICROALBUMIN UR DL<=1MG/L-MCNC: 11 UG/ML
MICROALBUMIN/CREATININE RATIO: 14.9 UG/MG

## 2018-04-03 RX ORDER — ESOMEPRAZOLE MAGNESIUM 40 MG/1
40 GRANULE, DELAYED RELEASE ORAL
Qty: 30 EACH | Refills: 2 | Status: CANCELLED | OUTPATIENT
Start: 2018-04-03 | End: 2019-04-03

## 2018-04-03 NOTE — TELEPHONE ENCOUNTER
"The communication I recd from the pharmacy stated "The preferred alternative is Nexium, pantoprazole sodium, omeprazole" - This was from [facility] [phone] rec'd 4/2/18.    Please clarify.  I will send pantoprazole 40 if that is what is needed.  "

## 2018-04-03 NOTE — TELEPHONE ENCOUNTER
----- Message from Denise Clark sent at 4/3/2018  8:44 AM CDT -----  Contact: Son  States the pt is returning a missed call, the pt can be reached at [phone]///[email or web address]

## 2018-04-03 NOTE — TELEPHONE ENCOUNTER
Nexium is not cover by the insurance company.  They do cover Protonix or Prilosec.  Please advise

## 2018-04-03 NOTE — TELEPHONE ENCOUNTER
The prescription I sent 4/2/18 was for generic albuterol.  They can fill it with whichever brand is covered.

## 2018-04-03 NOTE — TELEPHONE ENCOUNTER
----- Message from Steve Bruno sent at 4/3/2018  8:28 AM CDT -----  Donovan Drea (Son) is requesting a call from nurse to get more information regarding the pt medication.          Please call Donovan Drea (Son) back at [phone]

## 2018-04-04 ENCOUNTER — TELEPHONE (OUTPATIENT)
Dept: INTERNAL MEDICINE | Facility: CLINIC | Age: 78
End: 2018-04-04

## 2018-04-04 DIAGNOSIS — R05.3 PERSISTENT COUGH FOR 3 WEEKS OR LONGER: Primary | ICD-10-CM

## 2018-04-04 RX ORDER — ALBUTEROL SULFATE 90 UG/1
2 AEROSOL, METERED RESPIRATORY (INHALATION) EVERY 6 HOURS PRN
Qty: 1 INHALER | Refills: 0 | Status: SHIPPED | OUTPATIENT
Start: 2018-04-04 | End: 2018-11-05

## 2018-04-04 RX ORDER — PANTOPRAZOLE SODIUM 40 MG/1
40 TABLET, DELAYED RELEASE ORAL DAILY
Qty: 30 TABLET | Refills: 2 | Status: SHIPPED | OUTPATIENT
Start: 2018-04-04 | End: 2019-04-04

## 2018-04-04 NOTE — TELEPHONE ENCOUNTER
Returned the patient son phone call. Informed him of his mom results. Pt son verbalized understanding of the information given.

## 2018-04-04 NOTE — TELEPHONE ENCOUNTER
----- Message from Aye Farfan sent at 4/4/2018 10:43 AM CDT -----  Contact: bryson/pt son   Caller was returning nurse call.   ..[phone] (home)

## 2018-05-17 ENCOUNTER — TELEPHONE (OUTPATIENT)
Dept: INTERNAL MEDICINE | Facility: CLINIC | Age: 78
End: 2018-05-17

## 2018-05-18 NOTE — TELEPHONE ENCOUNTER
Received records from last year of visits to Dr. Vicki Montenegro, [address] [facility], [place], [phone]. Records include 8 visits from 4/11/17 through 4/16/18.  No labs or immunizations found.  Pt txd for various URI, pharyngitis, rhinitis, cellulitis, lumbar spondylosis with Rx meds, supportive care. DM2 mentioned once as dx 4/2017 but no lab or therapy at that time.  This will act as a summary of the information obtained with SHAQUILLE request signed by pt at recent office visit.

## 2018-06-18 ENCOUNTER — TELEPHONE (OUTPATIENT)
Dept: DERMATOLOGY | Facility: CLINIC | Age: 78
End: 2018-06-18

## 2018-11-05 ENCOUNTER — OFFICE VISIT (OUTPATIENT)
Dept: CARDIOLOGY | Facility: CLINIC | Age: 78
End: 2018-11-05
Payer: MEDICARE

## 2018-11-05 ENCOUNTER — OFFICE VISIT (OUTPATIENT)
Dept: OTOLARYNGOLOGY | Facility: CLINIC | Age: 78
End: 2018-11-05
Payer: MEDICARE

## 2018-11-05 VITALS
BODY MASS INDEX: 19.76 KG/M2 | TEMPERATURE: 99 F | HEART RATE: 95 BPM | WEIGHT: 115.75 LBS | HEART RATE: 92 BPM | HEIGHT: 64 IN | DIASTOLIC BLOOD PRESSURE: 70 MMHG | SYSTOLIC BLOOD PRESSURE: 120 MMHG | SYSTOLIC BLOOD PRESSURE: 110 MMHG | DIASTOLIC BLOOD PRESSURE: 60 MMHG | WEIGHT: 115.75 LBS | BODY MASS INDEX: 19.87 KG/M2

## 2018-11-05 DIAGNOSIS — I10 ESSENTIAL HYPERTENSION: Primary | ICD-10-CM

## 2018-11-05 DIAGNOSIS — K11.7 INCREASED SALIVATION: ICD-10-CM

## 2018-11-05 DIAGNOSIS — R05.3 PERSISTENT COUGH: Primary | ICD-10-CM

## 2018-11-05 DIAGNOSIS — J84.9 ILD (INTERSTITIAL LUNG DISEASE): ICD-10-CM

## 2018-11-05 DIAGNOSIS — R00.2 PALPITATION: ICD-10-CM

## 2018-11-05 DIAGNOSIS — E11.42 TYPE 2 DIABETES MELLITUS WITH DIABETIC POLYNEUROPATHY, WITHOUT LONG-TERM CURRENT USE OF INSULIN: ICD-10-CM

## 2018-11-05 PROCEDURE — 99214 OFFICE O/P EST MOD 30 MIN: CPT | Mod: PBBFAC | Performed by: INTERNAL MEDICINE

## 2018-11-05 PROCEDURE — 99999 PR PBB SHADOW E&M-EST. PATIENT-LVL IV: CPT | Mod: PBBFAC,,, | Performed by: INTERNAL MEDICINE

## 2018-11-05 PROCEDURE — 99213 OFFICE O/P EST LOW 20 MIN: CPT | Mod: PBBFAC,27 | Performed by: PHYSICIAN ASSISTANT

## 2018-11-05 PROCEDURE — 99213 OFFICE O/P EST LOW 20 MIN: CPT | Mod: S$PBB,ICN,, | Performed by: PHYSICIAN ASSISTANT

## 2018-11-05 PROCEDURE — 99999 PR PBB SHADOW E&M-EST. PATIENT-LVL III: CPT | Mod: PBBFAC,,, | Performed by: PHYSICIAN ASSISTANT

## 2018-11-05 PROCEDURE — 99204 OFFICE O/P NEW MOD 45 MIN: CPT | Mod: S$PBB,,, | Performed by: INTERNAL MEDICINE

## 2018-11-05 NOTE — PROGRESS NOTES
Subjective:   Patient ID:  Carol Arias is a 78 y.o. female who presents for evaluation of Establish Care; Shortness of Breath; Extremity Weakness; and Cough      77 yo female, came in for SOB, Chadian-speaking, accompanied by her son.  PMH ILD and DM.  Dx of ILD at [facility] recently and steroid tapered down.   Still has SOB, palpitation and dizziness. Chest pain related to dry cough.  EKG showed sinus tachy  No smoking/drinking.          Past Medical History:   Diagnosis Date    Diabetes mellitus, type 2     Hypertension        No past surgical history on file.    Social History     Tobacco Use    Smoking status: Never Smoker    Smokeless tobacco: Never Used   Substance Use Topics    Alcohol use: No     Alcohol/week: 0.0 oz    Drug use: No       Family History   Problem Relation Age of Onset    No Known Problems Mother     No Known Problems Father        Review of Systems   Constitution: Negative for decreased appetite, diaphoresis, fever, weakness, malaise/fatigue and night sweats.   HENT: Negative for nosebleeds.    Eyes: Negative for blurred vision and double vision.   Cardiovascular: Negative for chest pain, claudication, dyspnea on exertion, irregular heartbeat, leg swelling, near-syncope, orthopnea, palpitations, paroxysmal nocturnal dyspnea and syncope.   Respiratory: Positive for cough, shortness of breath and sputum production. Negative for sleep disturbances due to breathing, snoring and wheezing.    Endocrine: Negative for cold intolerance and polyuria.   Hematologic/Lymphatic: Does not bruise/bleed easily.   Skin: Negative for rash.   Musculoskeletal: Negative for back pain, falls, joint pain, joint swelling and neck pain.   Gastrointestinal: Negative for abdominal pain, heartburn, nausea and vomiting.   Genitourinary: Negative for dysuria, frequency and hematuria.   Neurological: Negative for difficulty with concentration, dizziness, focal weakness, headaches, light-headedness, numbness and  seizures.   Psychiatric/Behavioral: Negative for depression, memory loss and substance abuse. The patient does not have insomnia.    Allergic/Immunologic: Negative for HIV exposure and hives.       Objective:   Physical Exam   Constitutional: She is oriented to person, place, and time. She appears well-nourished.   HENT:   Head: Normocephalic.   Eyes: Pupils are equal, round, and reactive to light.   Neck: Normal carotid pulses and no JVD present. Carotid bruit is not present. No thyromegaly present.   Cardiovascular: Regular rhythm, normal heart sounds and normal pulses.  No extrasystoles are present. Tachycardia present. PMI is not displaced. Exam reveals no gallop and no S3.   No murmur heard.  Pulmonary/Chest: Breath sounds normal. No stridor. No respiratory distress.   B/l fine crackle,   Abdominal: Soft. Bowel sounds are normal. There is no tenderness. There is no rebound.   Musculoskeletal: Normal range of motion.   Neurological: She is alert and oriented to person, place, and time.   Skin: Skin is intact. No rash noted.   Psychiatric: Her behavior is normal.       Lab Results   Component Value Date    CHOL 178 04/02/2018     Lab Results   Component Value Date    HDL 64 04/02/2018     Lab Results   Component Value Date    LDLCALC 96.0 04/02/2018     Lab Results   Component Value Date    TRIG 90 04/02/2018     Lab Results   Component Value Date    CHOLHDL 36.0 04/02/2018       Chemistry        Component Value Date/Time     (L) 01/09/2018 1240    K 5.2 (H) 01/09/2018 1240    CL 99 01/09/2018 1240    CO2 25 01/09/2018 1240    BUN 13 01/09/2018 1240    CREATININE 0.7 01/09/2018 1240    GLU 89 01/09/2018 1240        Component Value Date/Time    CALCIUM 8.7 01/09/2018 1240    ALKPHOS 47 (L) 01/09/2018 1240    AST 38 01/09/2018 1240    ALT 15 01/09/2018 1240    BILITOT 0.2 01/09/2018 1240    ESTGFRAFRICA >60 01/09/2018 1240    EGFRNONAA >60 01/09/2018 1240          Lab Results   Component Value Date    HGBA1C  6.4 (H) 04/02/2018     Lab Results   Component Value Date    TSH 0.420 01/09/2018     Lab Results   Component Value Date    INR 1.0 01/09/2018    INR 1.0 04/03/2014     Lab Results   Component Value Date    WBC 6.22 01/09/2018    HGB 12.1 01/09/2018    HCT 36.2 (L) 01/09/2018    MCV 92 01/09/2018     01/09/2018     BNP  @LABRCNTIP(BNP,BNPTRIAGEBLO)@  CrCl cannot be calculated (Patient's most recent lab result is older than the maximum 7 days allowed.).  No results found in the last 24 hours.  No results found in the last 24 hours.  No results found in the last 24 hours.    Assessment:      1. Essential hypertension    2. Palpitation    3. ILD (interstitial lung disease)    4. Type 2 diabetes mellitus with diabetic polyneuropathy, without long-term current use of insulin      Tachycardia and SOB mainly due to ILD.  BP good  SaO2 96% on RA  Plan:   Echo   Refer to [facility].  Continue current meds.  Recommend heart-healthy diet, weight control and regular exercise.  Lori. Risk modification.   RTC as needed    I have reviewed all pertinent labs and cardiac studies. Plans and recommendations have been formulated under my direct supervision. All questions answered and patient voiced understanding. Patient to continue current medications.

## 2018-11-05 NOTE — PROGRESS NOTES
Subjective:       Patient ID: Carol Arias is a 78 y.o. female.    Chief Complaint: Consult; Cough; and Other (increased salivation)    Ms. Arias is a very pleasant 79 yo female here to see me today for the first time for dry cough x 1 year and increased salivation x 6 months. Denies any fever. She has seen pcp, pulmonology and cardiology. She was seen by pulmonology and started on prednisone. She states that steroids helped some. Her CT also showed some evidence of bronchiectasis. She denies any drooling, difficulty swallowing or respiratory distress. She denies smoking history. She has a diagnosis of ILD.       Review of Systems   Constitutional: Negative for chills, fatigue, fever and unexpected weight change.   HENT: Negative for congestion, dental problem, ear discharge, ear pain, facial swelling, hearing loss, nosebleeds, postnasal drip, rhinorrhea, sinus pressure, sneezing, sore throat, tinnitus, trouble swallowing and voice change.         Increased salivation   Eyes: Negative for redness, itching and visual disturbance.   Respiratory: Positive for cough and shortness of breath. Negative for choking and wheezing.    Cardiovascular: Negative for chest pain and palpitations.   Gastrointestinal: Negative for abdominal pain.        No reflux.   Musculoskeletal: Negative for gait problem.   Skin: Negative for rash.   Neurological: Negative for dizziness, light-headedness and headaches.       Objective:      Physical Exam   Constitutional: She is oriented to person, place, and time. She appears well-developed and well-nourished. No distress.   HENT:   Head: Normocephalic and atraumatic.   Right Ear: Tympanic membrane, external ear and ear canal normal.   Left Ear: Tympanic membrane, external ear and ear canal normal.   Nose: Nose normal. No mucosal edema, rhinorrhea, nasal deformity or septal deviation. No epistaxis. Right sinus exhibits no maxillary sinus tenderness and no frontal sinus tenderness. Left sinus  exhibits no maxillary sinus tenderness and no frontal sinus tenderness.   Mouth/Throat: Uvula is midline, oropharynx is clear and moist and mucous membranes are normal. Mucous membranes are not pale and not dry. No dental caries. No oropharyngeal exudate or posterior oropharyngeal erythema.   Eyes: Conjunctivae, EOM and lids are normal. Pupils are equal, round, and reactive to light. Right eye exhibits no chemosis. Left eye exhibits no chemosis. Right conjunctiva is not injected. Left conjunctiva is not injected. No scleral icterus. Right eye exhibits normal extraocular motion and no nystagmus. Left eye exhibits normal extraocular motion and no nystagmus.   Neck: Trachea normal and phonation normal. No tracheal tenderness present. No tracheal deviation present. No thyroid mass and no thyromegaly present.   Cardiovascular: Intact distal pulses.   Pulmonary/Chest: Effort normal. No stridor. No respiratory distress.   Abdominal: She exhibits no distension.   Lymphadenopathy:        Head (right side): No submental, no submandibular, no preauricular, no posterior auricular and no occipital adenopathy present.        Head (left side): No submental, no submandibular, no preauricular, no posterior auricular and no occipital adenopathy present.     She has no cervical adenopathy.   Neurological: She is alert and oriented to person, place, and time. No cranial nerve deficit.   Skin: Skin is warm and dry. No rash noted. No erythema.   Psychiatric: She has a normal mood and affect. Her behavior is normal.       Assessment:       1. Persistent cough    2. Increased salivation        Plan:       Persistent Cough: We discussed continuing acid reflux medications. She is in no distress today and has not coughed during examination. I les rec that she f/u with pulmonology. She will RTC as needed. We also discussed that if symptoms persisted, I would consider scoping her at her next visit.     Increased Salivation: not seen on exam  today. We did discuss that there are medications ( such as robinul) that we could try if problem becomes worse.  She will RCT as needed.

## 2018-11-16 ENCOUNTER — TELEPHONE (OUTPATIENT)
Dept: CARDIOLOGY | Facility: CLINIC | Age: 78
End: 2018-11-16

## 2018-11-16 ENCOUNTER — CLINICAL SUPPORT (OUTPATIENT)
Dept: CARDIOLOGY | Facility: CLINIC | Age: 78
End: 2018-11-16
Attending: INTERNAL MEDICINE
Payer: MEDICARE

## 2018-11-16 DIAGNOSIS — R00.2 PALPITATION: ICD-10-CM

## 2018-11-16 LAB
AORTIC VALVE REGURGITATION: NORMAL
DIASTOLIC DYSFUNCTION: NO
MITRAL VALVE MOBILITY: NORMAL
RETIRED EF AND QEF - SEE NOTES: 60 (ref 55–65)

## 2018-11-16 PROCEDURE — 93306 TTE W/DOPPLER COMPLETE: CPT | Mod: PBBFAC | Performed by: INTERNAL MEDICINE

## 2019-01-03 ENCOUNTER — TELEPHONE (OUTPATIENT)
Dept: PULMONOLOGY | Facility: CLINIC | Age: 79
End: 2019-01-03

## 2019-01-03 DIAGNOSIS — R06.02 SOB (SHORTNESS OF BREATH): Primary | ICD-10-CM

## 2019-01-08 ENCOUNTER — TELEPHONE (OUTPATIENT)
Dept: PULMONOLOGY | Facility: CLINIC | Age: 79
End: 2019-01-08

## 2019-02-13 ENCOUNTER — INITIAL CONSULT (OUTPATIENT)
Dept: PULMONOLOGY | Facility: CLINIC | Age: 79
End: 2019-02-13
Payer: MEDICARE

## 2019-02-13 ENCOUNTER — HOSPITAL ENCOUNTER (OUTPATIENT)
Dept: RADIOLOGY | Facility: HOSPITAL | Age: 79
Discharge: HOME | End: 2019-02-13
Attending: INTERNAL MEDICINE
Payer: MEDICARE

## 2019-02-13 ENCOUNTER — CLINICAL SUPPORT (OUTPATIENT)
Dept: PULMONOLOGY | Facility: CLINIC | Age: 79
End: 2019-02-13
Payer: MEDICARE

## 2019-02-13 VITALS
RESPIRATION RATE: 18 BRPM | WEIGHT: 110 LBS | SYSTOLIC BLOOD PRESSURE: 114 MMHG | DIASTOLIC BLOOD PRESSURE: 66 MMHG | HEART RATE: 87 BPM | HEIGHT: 64 IN | BODY MASS INDEX: 18.78 KG/M2 | OXYGEN SATURATION: 97 %

## 2019-02-13 DIAGNOSIS — J84.9 ILD (INTERSTITIAL LUNG DISEASE): Primary | ICD-10-CM

## 2019-02-13 DIAGNOSIS — R76.8 ANA POSITIVE: ICD-10-CM

## 2019-02-13 DIAGNOSIS — R05.3 CHRONIC COUGH: ICD-10-CM

## 2019-02-13 DIAGNOSIS — R06.02 SOB (SHORTNESS OF BREATH): ICD-10-CM

## 2019-02-13 DIAGNOSIS — R91.8 GROUND GLASS OPACITY PRESENT ON IMAGING OF LUNG: ICD-10-CM

## 2019-02-13 DIAGNOSIS — R94.2 RESTRICTIVE VENTILATORY DEFECT: ICD-10-CM

## 2019-02-13 PROCEDURE — 71046 X-RAY EXAM CHEST 2 VIEWS: CPT | Mod: TC

## 2019-02-13 PROCEDURE — 94060 PR EVAL OF BRONCHOSPASM: ICD-10-PCS | Mod: 26,S$PBB,, | Performed by: INTERNAL MEDICINE

## 2019-02-13 PROCEDURE — 94060 EVALUATION OF WHEEZING: CPT | Mod: 26,S$PBB,, | Performed by: INTERNAL MEDICINE

## 2019-02-13 PROCEDURE — 94060 EVALUATION OF WHEEZING: CPT | Mod: PBBFAC

## 2019-02-13 PROCEDURE — 71046 XR CHEST PA AND LATERAL: ICD-10-PCS | Mod: 26,,, | Performed by: RADIOLOGY

## 2019-02-13 PROCEDURE — 99999 PR PBB SHADOW E&M-EST. PATIENT-LVL IV: ICD-10-PCS | Mod: PBBFAC,,, | Performed by: INTERNAL MEDICINE

## 2019-02-13 PROCEDURE — 99205 PR OFFICE/OUTPT VISIT, NEW, LEVL V, 60-74 MIN: ICD-10-PCS | Mod: 25,S$PBB,, | Performed by: INTERNAL MEDICINE

## 2019-02-13 PROCEDURE — 99205 OFFICE O/P NEW HI 60 MIN: CPT | Mod: 25,S$PBB,, | Performed by: INTERNAL MEDICINE

## 2019-02-13 PROCEDURE — 99214 OFFICE O/P EST MOD 30 MIN: CPT | Mod: PBBFAC,25 | Performed by: INTERNAL MEDICINE

## 2019-02-13 PROCEDURE — 99999 PR PBB SHADOW E&M-EST. PATIENT-LVL IV: CPT | Mod: PBBFAC,,, | Performed by: INTERNAL MEDICINE

## 2019-02-13 PROCEDURE — 71046 X-RAY EXAM CHEST 2 VIEWS: CPT | Mod: 26,,, | Performed by: RADIOLOGY

## 2019-02-13 RX ORDER — MONTELUKAST SODIUM 10 MG/1
TABLET ORAL
COMMUNITY
Start: 2018-04-12

## 2019-02-13 RX ORDER — VALSARTAN AND HYDROCHLOROTHIAZIDE 160; 12.5 MG/1; MG/1
TABLET, FILM COATED ORAL
COMMUNITY
Start: 2018-03-14

## 2019-02-13 RX ORDER — PROMETHAZINE HYDROCHLORIDE AND CODEINE PHOSPHATE 6.25; 1 MG/5ML; MG/5ML
SOLUTION ORAL
Refills: 2 | COMMUNITY
Start: 2019-01-22

## 2019-02-13 NOTE — PROGRESS NOTES
Subjective:       Patient ID: Carol Arias is a 78 y.o. female.  Referred by Dr Tay Cherry, Son present Mr Shepard.  Mother speak limited english, son main care giver, asked for 2nd opinion, seen Dr Ezio AVILES:   0/2018 to 01/2019: had Chest CT 05/2018: showed ;  Significant groundglass infiltrates and some mild bronchiectatic changes in  the bilateral lower lobes posteriorly.  Peripheral reticular and groundglass  opacities within the upper and mid lungs.  Findings are concerning for an  underlying interstitial lung disease with active alveolitis changes.  Infectious process superimposed on pulmonary fibrosis have this appearance as  Well  ALSO HAD +VE ALEJANDRO, SPECKLED 1;320, aCE 46  ESR was 24, ANCA was -ve, Hypersensitivity panel ws ordered  Also reviewed  anti DNA   Anti SSA   Anti SSB   Anti Sm   Anti Sm/RNP   Anti Scl70   Anti Centromere   ALEJANDRO   Rheumatoid Factor   ALEJANDRO Titr Ser HEp2 subst   ALEJANDRO Pattern      C-Reactive Protein   Myeloperoxidase Ab, S   Proteinase 3 Ab (PR3), S         Chief Complaint:  Pulmonary Fibrosis  She presents for evaluation and treatment of pulmonary fibrosis. The fibrosis has been present for several months. She is having symptoms of non-productive cough which have been present for 1 year and are gradually worsening. Symptoms are exacerbated by NOTHING and relieved by NOTHING. Other symptoms include dyspnea on exertion and palmar erythema, skin rash . She has tried Prednisone 40 mg for 6 weeks, which provides no. The patient reports exposure to NONE. Occupational history Working in dry , and in [place] prior to [place] to [place] from [place]..  Never smoker, No +ve PPD    The following portions of the patient's history were reviewed and updated as appropriate:   She  has a past medical history of Diabetes mellitus, type 2 and Hypertension.  She does not have any pertinent problems on file.  She  has no past surgical history on file.  Her family history includes No Known Problems  in her father and mother.  She  reports that  has never smoked. she has never used smokeless tobacco. She reports that she does not drink alcohol or use drugs.  She has a current medication list which includes the following prescription(s): amlodipine, glyburide-metformin 2.5-500 mg, latanoprost, lovastatin, meloxicam, montelukast, pantoprazole, sitagliptan-metformin, triamcinolone acetonide 0.1%, valsartan-hydrochlorothiazide, fluticasone, promethazine-codeine 6.25-10 mg/5 ml, and esomeprazole.  Current Outpatient Medications on File Prior to Visit   Medication Sig Dispense Refill    amLODIPine (NORVASC) 5 MG tablet TAKE 1 TABLET(5 MG) BY MOUTH EVERY DAY 90 tablet 0    glyBURIDE-metformin 2.5-500 mg (GLUCOVANCE) 2.5-500 mg per tablet Take 1 tablet by mouth 2 (two) times daily with meals.       latanoprost 0.005 % ophthalmic solution Place 1 drop into both eyes every evening.       lovastatin (MEVACOR) 10 MG tablet Take 10 mg by mouth every evening.       meloxicam (MOBIC) 7.5 MG tablet Take 7.5 mg by mouth as needed.       montelukast (SINGULAIR) 10 mg tablet       pantoprazole (PROTONIX) 40 MG tablet Take 1 tablet (40 mg total) by mouth once daily. 30 tablet 2    sitagliptan-metformin (JANUMET) 50-1,000 mg per tablet Take 1 tablet by mouth 2 (two) times daily with meals.      triamcinolone acetonide 0.1% (KENALOG) 0.1 % cream Apply topically 2 (two) times daily.      valsartan-hydrochlorothiazide (DIOVAN-HCT) 160-12.5 mg per tablet       fluticasone (FLONASE) 50 mcg/actuation nasal spray 2 sprays by Each Nare route once daily. 1 Bottle 5    promethazine-codeine 6.25-10 mg/5 ml (PHENERGAN WITH CODEINE) 6.25-10 mg/5 mL syrup TK 5 MLS PO Q 8 H PRN  2    [DISCONTINUED] esomeprazole (NEXIUM) 40 mg GrPS Take 40 mg by mouth before breakfast. 30 each 2     No current facility-administered medications on file prior to visit.      She is allergic to b12 [cyanocobalamin-cobamamide]..    Review of Systems  Review  "of Systems   Constitutional: Positive for malaise/fatigue.   HENT: Negative.    Eyes: Negative.    Respiratory: Positive for cough.    Cardiovascular: Negative.    Genitourinary: Negative.    Musculoskeletal: Negative.    Skin: Positive for rash.   Psychiatric/Behavioral: Negative.         Objective:     Vitals:    02/13/19 1620   BP: 114/66   Pulse: 87   Resp: 18   SpO2: 97%   Weight: 49.9 kg (110 lb)   Height: 5' 4" (1.626 m)       Physical Exam   Constitutional: She is oriented to person, place, and time. Vital signs are normal. She appears well-developed.       HENT:   Head: Normocephalic and atraumatic.   Nose: Nose normal.   Mouth/Throat: Oropharynx is clear and moist.   Eyes: EOM are normal. Pupils are equal, round, and reactive to light.   Neck: Normal range of motion. Neck supple. No thyromegaly present.   Cardiovascular: Normal rate, regular rhythm and normal heart sounds.   No murmur heard.  Pulmonary/Chest: Effort normal. No stridor. No respiratory distress. She has no wheezes. She has rales (DRY VELCRO crackles).   Abdominal: Soft. Bowel sounds are normal.   Musculoskeletal: Normal range of motion. She exhibits deformity. She exhibits no edema.   Lymphadenopathy:     She has no cervical adenopathy.   Neurological: She is alert and oriented to person, place, and time. No cranial nerve deficit.   Skin: Skin is warm and dry. Capillary refill takes 2 to 3 seconds. Rash noted.   Psychiatric: She has a normal mood and affect.   Nursing note and vitals reviewed.        Data Review:   +ve ALEJANDRO, speckles 1:320      Diagnostics: Chest x-ray:   FINDINGS:  There appear to be some fibrotic changes within the bilateral lung bases.  No definite infiltrates or effusions are identified.  The heart is mildly enlarged.  There is calcification of the aortic knob.    CT of chest performed 12/2018  CLINICAL HISTORY:   Interstitial pulmonary disease, unspecified.  Bronchiectasis, uncomplicated.  TECHNIQUE:   Chest CT without " contrast.  Soft tissue and lung algorithms.  Coronal and sagittal reformations.  COMPARISON STUDY:  6/6/2018 .  FINDINGS:    Stable mild cardiomegaly.  Stable ectasia ascending aorta, 3.8 x  3.7 cm.  Minimal aortic calcification.  No mediastinal or hilar  lymphadenopathy.  Trachea and central bronchi are widely patent.  There is a  stable pattern of pulmonary fibrosis with bilaterally symmetric dependent lower  lobe atelectasis and groundglass opacity/reticulation.  No honeycombing.  Similar less pronounced findings are noted involving the periphery of the upper  lobes.  No active infiltrate or pleural fluid collection.  The visualized  portion of the upper abdominal viscera is unremarkable.  Degenerative spine.  Dictated and Electronically Signed By: Mesfin Smyth MD on December 17, 2018  IMPRESSION:   Stable chest CT with pulmonary fibrosis sequela, detailed above.  All CT scans at [this location] are performed using dose modulation techniques  as appropriate to a performed exam including the following: automated exposure  control; adjustment of the mA and/or kV according to patient size (this  includes techniques or standardized protocols for targeted exams where dose is  matched to indication / reason for exam; i.e. extremities or head); use of  iterative reconstruction technique.      Pulmonary function tests: FEV1: 0.73L  (44 % predicted), FVC:  0.73L (35.4 % predicted), FEV1/FVC:  100                     dactylitis      Assessment:      Problem List Items Addressed This Visit     ILD (interstitial lung disease) - Primary    Relevant Orders    Complete PFT without bronchodilator - Clinic    PULM - Arterial Blood Gases--in addition to PFT only    Stress test, pulmonary    Ambulatory Referral to Rheumatology    Sedimentation rate    C-REACTIVE PROTEIN    FUNGAL PRECIPITINS (HYPERSENSITIVITY PNEUMONITISI)    ANGIOTENSIN CONVERTING ENZYME    ALEJANDRO      Other Visit Diagnoses     Restrictive ventilatory defect         Relevant Orders    Complete PFT without bronchodilator - Clinic    PULM - Arterial Blood Gases--in addition to PFT only    Stress test, pulmonary    Ambulatory Referral to Rheumatology    Chronic cough        Relevant Orders    X-Ray Sinuses 3 or more views    Ambulatory Referral to Rheumatology    ALEJANDRO positive        Relevant Orders    Ambulatory Referral to Rheumatology    Ground glass opacity present on imaging of lung        Relevant Orders    QUANTIFERON GOLD TB    RHEUMATOID FACTOR    Ambulatory Referral to Rheumatology           Plan:     No distinctive features of UIP    ref to Rheum  GGO with +ve ALEJANDRO, 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  Needs HRCT, bring chest CT 12/2018 before order  Other differentials: edema, alveolitis or HSP, drug rxn  Will eventually need bronch, VATS indication will depend on if conclusive cohesive diagnosis cannot be determined with serology.  Decline vaccination, colonoscopy.      Follow-up in about 4 weeks (around 3/13/2019), or CD disc chest CT, labs, xray sinus. PFT, ABG, 6MWD, , for Referal to Rheumatology, PROXY access Sign up my [facility].    This note was prepared using voice recognition system and is likely to have sound alike errors that may have been overlooked even after proof reading.  Please call me with any questions    Discussed diagnosis, its evaluation, treatment and usual course. All questions answered.    Thank you for the courtesy of participating in the care of this patient    Phil Burnette MD

## 2019-02-13 NOTE — LETTER
February 13, 2019      Tay Cherry MD  [address]  [place] [address]           [facility]  [address] [place] [id number]  Phone: [phone]  Fax: [phone]          Patient: Carol Arias   MR Number: [id number]   YOB: 1940   Date of Visit: 2/13/2019       Dear Dr. Tay Cherry:    Thank you for referring Carol Arias to me for evaluation. Attached you will find relevant portions of my assessment and plan of care.    If you have questions, please do not hesitate to call me. I look forward to following Carol Arias along with you.    Sincerely,    Phil Burnette MD    Enclosure  CC:  No Recipients    If you would like to receive this communication electronically, please contact [email or web address] or [phone] to request more information on [email or web address] Link access.    For providers and/or their staff who would like to refer a patient to [facility], please contact us through our one-stop-shop provider referral line, [facility], at [phone].    If you feel you have received this communication in error or would no longer like to receive these types of communications, please e-mail [email or web address]

## 2019-02-14 ENCOUNTER — HOSPITAL ENCOUNTER (OUTPATIENT)
Dept: RADIOLOGY | Facility: HOSPITAL | Age: 79
Discharge: HOME | End: 2019-02-14
Attending: INTERNAL MEDICINE
Payer: MEDICARE

## 2019-02-14 DIAGNOSIS — R05.3 CHRONIC COUGH: ICD-10-CM

## 2019-02-14 LAB
BRPFT: ABNORMAL
FEF 25 75 CHG: 13.9 %
FEF 25 75 LLN: 0.67
FEF 25 75 POST REF: 98.4 %
FEF 25 75 PRE REF: 86.4 %
FEF 25 75 REF: 1.49
FET100 CHG: 60.5 %
FEV1 CHG: 3.2 %
FEV1 FVC CHG: -4 %
FEV1 FVC LLN: 67
FEV1 FVC POST REF: 120.2 %
FEV1 FVC PRE REF: 125.2 %
FEV1 FVC REF: 80
FEV1 LLN: 1.12
FEV1 POST REF: 46.1 %
FEV1 PRE REF: 44.7 %
FEV1 REF: 1.64
FVC CHG: 7.5 %
FVC LLN: 1.44
FVC POST REF: 38.1 %
FVC PRE REF: 35.4 %
FVC REF: 2.07
PEF CHG: 42.3 %
POST FEF 25 75: 1.47 L/S (ref 0.67–2.68)
POST FET 100: 1.39 SEC
POST FEV1 FVC: 96.01 % (ref 67.43–90.93)
POST FEV1: 0.76 L (ref 1.12–2.13)
POST FVC: 0.79 L (ref 1.44–2.73)
POST PEF: 1.95 L/S
PRE FEF 25 75: 1.29 L/S (ref 0.67–2.68)
PRE FET 100: 0.87 SEC
PRE FEV1 FVC: 100 % (ref 67.43–90.93)
PRE FEV1: 0.73 L (ref 1.12–2.13)
PRE FVC: 0.73 L (ref 1.44–2.73)
PRE PEF: 1.37 L/S

## 2019-02-14 PROCEDURE — 70220 X-RAY EXAM OF SINUSES: CPT | Mod: TC

## 2019-02-14 PROCEDURE — 70220 XR SINUSES MIN 3 VIEWS: ICD-10-PCS | Mod: 26,,, | Performed by: RADIOLOGY

## 2019-02-14 PROCEDURE — 70220 X-RAY EXAM OF SINUSES: CPT | Mod: 26,,, | Performed by: RADIOLOGY

## 2019-03-13 ENCOUNTER — OFFICE VISIT (OUTPATIENT)
Dept: RHEUMATOLOGY | Facility: CLINIC | Age: 79
End: 2019-03-13
Payer: MEDICARE

## 2019-03-13 VITALS
WEIGHT: 107.81 LBS | SYSTOLIC BLOOD PRESSURE: 118 MMHG | DIASTOLIC BLOOD PRESSURE: 78 MMHG | BODY MASS INDEX: 19.84 KG/M2 | HEIGHT: 62 IN | HEART RATE: 66 BPM

## 2019-03-13 DIAGNOSIS — R76.8 POSITIVE ANA (ANTINUCLEAR ANTIBODY): ICD-10-CM

## 2019-03-13 DIAGNOSIS — M65.9 TENOSYNOVITIS: ICD-10-CM

## 2019-03-13 DIAGNOSIS — R21 RASH: ICD-10-CM

## 2019-03-13 DIAGNOSIS — I73.00 RAYNAUD'S PHENOMENON WITHOUT GANGRENE: ICD-10-CM

## 2019-03-13 DIAGNOSIS — J84.9 ILD (INTERSTITIAL LUNG DISEASE): Primary | ICD-10-CM

## 2019-03-13 PROCEDURE — 99999 PR PBB SHADOW E&M-EST. PATIENT-LVL III: ICD-10-PCS | Mod: PBBFAC,,, | Performed by: INTERNAL MEDICINE

## 2019-03-13 PROCEDURE — 99205 OFFICE O/P NEW HI 60 MIN: CPT | Mod: S$PBB,,, | Performed by: INTERNAL MEDICINE

## 2019-03-13 PROCEDURE — 99999 PR PBB SHADOW E&M-EST. PATIENT-LVL III: CPT | Mod: PBBFAC,,, | Performed by: INTERNAL MEDICINE

## 2019-03-13 PROCEDURE — 99205 PR OFFICE/OUTPT VISIT, NEW, LEVL V, 60-74 MIN: ICD-10-PCS | Mod: S$PBB,,, | Performed by: INTERNAL MEDICINE

## 2019-03-13 PROCEDURE — 99213 OFFICE O/P EST LOW 20 MIN: CPT | Mod: PBBFAC,PN | Performed by: INTERNAL MEDICINE

## 2019-03-13 NOTE — LETTER
March 13, 2019      Phil Burnette MD  [address] [facility]  [place] [address]           [facility]  [address] [place] [address] [address] [place] [address]  Phone: [phone]  Fax: [phone]          Patient: Carol Arias   MR Number: [id number]   YOB: 1940   Date of Visit: 3/13/2019       Dear Dr. Phil Burnette:    Thank you for referring Carol Arias to me for evaluation. Attached you will find relevant portions of my assessment and plan of care.    If you have questions, please do not hesitate to call me. I look forward to following Carol Arias along with you.    Sincerely,    Brian Rosen MD    Enclosure  CC:  No Recipients    If you would like to receive this communication electronically, please contact [email or web address] or [phone] to request more information on [employer] Link access.    For providers and/or their staff who would like to refer a patient to [facility], please contact us through our one-stop-shop provider referral line, [facility], at [phone].    If you feel you have received this communication in error or would no longer like to receive these types of communications, please e-mail [email or web address]

## 2019-03-13 NOTE — PATIENT INSTRUCTIONS
What is Trigger Finger?  Trigger finger is an inflammation of tissue inside your finger or thumb. It is also called tenosynovitis (ten-oh-sin-oh-VY-tis). Tendons (cordlike fibers that attach muscle to bone and allow you to bend the joints) become swollen. So does the synovium (a slick membrane that allows the tendons to move easily). This makes it difficult to straighten the finger or thumb.    Causes  Repeated use of a tool with strong gripping, such as a drill or wrench, can irritate and inflame the tendons and the synovium. It is also more common in certain medical conditions such as rheumatoid arthritis, gout, and diabetes. But often the cause of trigger finger is unknown.  Inside your finger  Tendons connect muscles in your forearm to the bones in your fingers. The tendons in each finger are surrounded by a protective tendon sheath. This sheath is lined with synovium, which produces a fluid that allows the tendons to slide easily when you bend and straighten the finger. If a tendon is irritated, it becomes inflamed.  When a tendon is inflamed  When a tendon is inflamed, it causes the lining of the tendon sheath to swell and thicken. Or the tendon itself may thicken. Then the sheath pinches the tendon, and the tendon can no longer slide easily inside the sheath. When you straighten your finger, the tendon sticks or locks as it tries to squeeze back through the sheath.    Symptoms  The first sign of trigger finger may be pain where the finger or thumb joins the palm. You may also notice some swelling. As the tendon becomes more inflamed, the finger may start to catch when you try to straighten it. When the locked tendon releases, the finger jumps, as if you were releasing the trigger of a gun. This further irritates the tendon, and may set up a cycle of catching and swelling.   Date Last Reviewed: 9/28/2015  © 8149-7288 [employer]. [address] [place] [address], [place] [address]. All rights reserved.  This information is not intended as a substitute for professional medical care. Always follow your healthcare professional's instructions.        Treating Trigger Finger     The tendon sheath is opened to release the tendon. Once the tendon can move freely again, the finger can bend and straighten more normally.     Trigger finger occurs when the tissue inside your finger or thumb becomes inflamed. Mild cases can be treated without surgery. If the problem is severe, surgery may be needed. Your doctor will discuss your options with you.  Nonsurgical treatment  For mild symptoms, your doctor may have you rest the finger or thumb. You may also be told to take anti-inflammatory medicines. These include ibuprofen or aspirin. You may be given an injection of medicine in the base of the finger or thumb. This typically is a steroid, such as cortisone.  Surgery  If nonsurgical treatments dont ease your symptoms, surgery may be recommended. A tendon is a cordlike fiber that attaches muscle to bone and allows joints to bend. The tendon is surrounded by a protective cover called a sheath. During surgery, the sheath in your finger or thumb is opened to enlarge the space and release the swollen tendon. This allows the finger or thumb to bend and straighten normally. Surgery takes about 20 minutes. It can often be done using a local anesthetic. You may be able to go home the same day. Your hand will be wrapped in a soft bandage. You may need to wear a plaster splint for a short time to keep the finger or thumb still as it heals. The stitches will be removed in about 2 weeks. Your doctor can discuss the risks and benefits of surgery with you.  Date Last Reviewed: 9/21/2015 © 2000-2017 [employer]. [address] [place] [address]. All rights reserved. This information is not intended as a substitute for professional medical care. Always follow your healthcare professional's instructions.

## 2019-03-13 NOTE — PROGRESS NOTES
RHEUMATOLOGY OUTPATIENT CLINIC NOTE    3/13/2019    Attending Rheumatologist: Brian Rosen  Primary Care Provider: Maryellen Alexis MD   Physician Requesting Consultation: Phil Burnette MD  [id number] [facility]  [place] [id number]  Chief Complaint/Reason For Consultation:  Positive ALEJANDRO      Subjective:       HPI  Carol Arias is a 79 y.o.  female with ILD and positive autoantibodies refer for rheumatic evaluation.  Patient with LEP (Indonesian speaking), son translates.  Patient has chronic ongoing dry cough and dyspnea on exertion.  Had workup done by Pulmonary which revealed positive autoantibodies that were negative on repeat testing.  Reported to have no progressive disease on imaging.  Recommended for rheumatic evaluation.    Today:  Voices no acute complaints.  Refers stable dyspnea on exertion and dry cough.  Trial of prednisone for 2 months resulted in no improvement to her dyspnea.  Denies having any fever.  She used to work in her business in dry cleaning.  She also has a chronic hyperpigmented, pruritic rash on her left lower extremity.  Reports bluish discoloration of her fingertips and toes, denies having any ulcers.  Complaints of intermittent painful locking and catching of her 4th left finger.  Denies any photosensitivity,  or GI complaints.    Review of Systems   Constitutional: Negative for fever and weight loss.   HENT:        Denies eye or mouth sicca symptoms, denies oral ulcers, denies parotid swelling, denies swollen glands.   Eyes: Negative for blurred vision, pain and redness.   Respiratory: Positive for cough (Number productive, chronic.  Stable.) and shortness of breath (Dyspnea on exertion.). Negative for hemoptysis and sputum production.    Cardiovascular: Negative for chest pain, orthopnea, leg swelling and PND.   Gastrointestinal: Negative for abdominal pain, blood in stool, constipation, diarrhea and heartburn.        Denies dysphagia.   Genitourinary: Negative  for dysuria and hematuria.        Denies genital ulcers or sicca symptoms, denies foaming of the urine, denies nephrolithiasis.   Musculoskeletal: Negative for back pain, joint pain, myalgias and neck pain.   Skin: Negative for rash.        +Rash LLE lateral shin  +Raynaud's phenomenon    Denies photosensitivity, denies alopecia, denies sclerodactyly,denies digital ulcers, no psoriasis, ulcerations, no purpura, denies nodules.   Neurological: Positive for tingling (Median nerve distribution.). Negative for sensory change, focal weakness, seizures, weakness and headaches.        Denies transient ischemic attack, denies strokes, denies seizures   Endo/Heme/Allergies: Does not bruise/bleed easily.        + history of fetal loss over 10 weeks gestation x1.    Denies frequent infections, denies deep vein thrombosis or pulmonary embolism.   Psychiatric/Behavioral: Negative for substance abuse. The patient does not have insomnia.    All other systems reviewed and are negative.    Chronic comorbid conditions affecting medical decision making today:  Past Medical History:   Diagnosis Date    Diabetes mellitus, type 2     Hypertension    · Osteoarthritis    History reviewed. No pertinent surgical history.  Family History   Problem Relation Age of Onset    No Known Problems Mother     No Known Problems Father      Social History     Substance and Sexual Activity   Alcohol Use No    Alcohol/week: 0.0 oz   · [place]. [place].  · Works in dry cleaning    Social History     Tobacco Use   Smoking Status Never Smoker   Smokeless Tobacco Never Used     Social History     Substance and Sexual Activity   Drug Use No       Current Outpatient Medications:     amLODIPine (NORVASC) 5 MG tablet, TAKE 1 TABLET(5 MG) BY MOUTH EVERY DAY, Disp: 90 tablet, Rfl: 0    fluticasone (FLONASE) 50 mcg/actuation nasal spray, 2 sprays by Each Nare route once daily., Disp: 1 Bottle, Rfl: 5    glyBURIDE-metformin  2.5-500 mg (GLUCOVANCE) 2.5-500 mg per tablet, Take 1 tablet by mouth 2 (two) times daily with meals. , Disp: , Rfl:     latanoprost 0.005 % ophthalmic solution, Place 1 drop into both eyes every evening. , Disp: , Rfl:     lovastatin (MEVACOR) 10 MG tablet, Take 10 mg by mouth every evening. , Disp: , Rfl:     meloxicam (MOBIC) 7.5 MG tablet, Take 7.5 mg by mouth as needed. , Disp: , Rfl:     montelukast (SINGULAIR) 10 mg tablet, , Disp: , Rfl:     pantoprazole (PROTONIX) 40 MG tablet, Take 1 tablet (40 mg total) by mouth once daily., Disp: 30 tablet, Rfl: 2    promethazine-codeine 6.25-10 mg/5 ml (PHENERGAN WITH CODEINE) 6.25-10 mg/5 mL syrup, TK 5 MLS PO Q 8 H PRN, Disp: , Rfl: 2    sitagliptan-metformin (JANUMET) 50-1,000 mg per tablet, Take 1 tablet by mouth 2 (two) times daily with meals., Disp: , Rfl:     triamcinolone acetonide 0.1% (KENALOG) 0.1 % cream, Apply topically 2 (two) times daily., Disp: , Rfl:     valsartan-hydrochlorothiazide (DIOVAN-HCT) 160-12.5 mg per tablet, , Disp: , Rfl:      Objective:         Vitals:    03/13/19 1543   BP: 118/78   Pulse: 66     Physical Exam   Nursing note and vitals reviewed.  Constitutional: She is well-developed, well-nourished, and in no distress.   HENT:   Head: Normocephalic.   Poor dentition.    no oral ulcers, normal pooling of saliva.  no parotid enlargement.   Eyes: Conjunctivae are normal. Pupils are equal, round, and reactive to light.   Absent Episcleritis/scleritis.   Neck: Normal range of motion.   Cardiovascular: Normal rate and intact distal pulses.    Murmur heard.  Slightly accentuated P2.   Pulmonary/Chest: No respiratory distress. She has rales.   Increased work of breathing.   Abdominal: Soft. She exhibits no distension.   Neurological: She is alert. No cranial nerve deficit.   absent sensory deficits  muscle strength 5/5 through.   Tinel's negative   Skin: Rash (Velvety, hyper pigmented rash left lower shin, scattered areas of  hypopigmentation surrounding.) noted. She is not diaphoretic.     Poor capillary refill, >2 seconds.  Slightly pale appearance of fingertips.    Slight puffy appearance of distal fingers, mild skin tightening.    No Skin fibrosis, teleangiectasias, discoid lesions, purpura, skin ulcers, nodules, or livedo reticularis, nail pitting.    Capillaroscopy:  Some capillary dropout, arborization.  No dilated loops or micro hemorrhages noted.   Musculoskeletal: Normal range of motion. She exhibits edema and tenderness (Fourth MCP volar aspect left hand.). She exhibits no deformity.   :  Fair.  No synovitis.    AROM: intact  PROM: intact    Devices used by patient: none       Reviewed old and all outside pertinent medical records available.    All lab results personally reviewed and interpreted by me.  Lab Results   Component Value Date    WBC 6.22 01/09/2018    HGB 12.1 01/09/2018    HCT 36.2 (L) 01/09/2018    MCV 92 01/09/2018    MCH 30.9 01/09/2018    MCHC 33.4 01/09/2018    RDW 13.1 01/09/2018     01/09/2018    MPV 8.9 (L) 01/09/2018       Lab Results   Component Value Date     (L) 01/09/2018    K 5.2 (H) 01/09/2018    CL 99 01/09/2018    CO2 25 01/09/2018    GLU 89 01/09/2018    BUN 13 01/09/2018    CALCIUM 8.7 01/09/2018    PROT 8.0 01/09/2018    ALBUMIN 3.6 01/09/2018    BILITOT 0.2 01/09/2018    AST 38 01/09/2018    ALKPHOS 47 (L) 01/09/2018    ALT 15 01/09/2018       Lab Results   Component Value Date    COLORU Yellow 01/09/2018    APPEARANCEUA Clear 01/09/2018    SPECGRAV 1.025 01/09/2018    PHUR 6.0 01/09/2018    PROTEINUA Negative 01/09/2018    KETONESU Negative 01/09/2018    LEUKOCYTESUR Negative 01/09/2018    NITRITE Negative 01/09/2018    UROBILINOGEN Negative 01/09/2018       Lab Results   Component Value Date    CRP 3.7 02/14/2019       Lab Results   Component Value Date    SEDRATE 30 (H) 02/14/2019       Lab Results   Component Value Date    RF <10.0 02/14/2019    SEDRATE 30 (H) 02/14/2019        No components found for: 25OHVITDTOT, 40DFGSZY0, 31WXWWAY8, METHODNOTE    No results found for: URICACID    No components found for: TSPOTTB    Hemoglobin A1c 6.4 4/2018    Rheum Labs:  ALEJANDRO 1:160/320 homogeneous 6/2018-> negative 2/2019  From [facility]:  Double-stranded DNA, SSA/B, SM, SM RNP, Scl 70, centromere antibodies not detected  Rheumatoid factor negative  ANCA screen negative  ACE: WNR    Infectious Labs:  QuantiFERON TB negative     Imaging:  All imaging reviewed and independently  interpreted by me.    Chest x-ray December 2017  interval development of a left lower lobe infiltrate. There is mild atelectasis in the right lower lobe. Lungs appear mildly hypoinflated. Mild right peribronchial cuffing is seen.    CT chest with contrast January 2018  atelectatic change in the dependent lung bases.  The lung fields are otherwise clear. No pleural fluid or pneumothorax. No adenopathy is identified.    CT chest June 2018  Comparisons are made to chest x-ray from 8/31/2018.  The lungs demonstrate the presence of some peripheral reticular and groundglass opacities throughout the upper and mid lungs, along with some confluent groundglass infiltrates within the posterior lower lobes.  Mild bronchiectatic changes in the lower lobes noted as well. There are no alveolar infiltrates, effusions, or parenchymal lung nodules.  Negative for pleural or pericardial effusions. There are no hilar or mediastinal masses or enlarged lymph nodes.  Mildly prominent right paratracheal lymph node.  The airways are patent.  The thyroid gland is normal.  The esophagus is normal.  Cardiac chamber enlargement. Status post cholecystectomy.  The upper abdominal organs are otherwise normal.    CT chest December 2018  Stable mild cardiomegaly.  Stable ectasia ascending aorta, 3.8 x3.7 cm.  Minimal aortic calcification.  No mediastinal or hilar  lymphadenopathy.  Trachea and central bronchi are widely patent.  There is a stable pattern of  pulmonary fibrosis with bilaterally symmetric dependent lower lobe atelectasis and groundglass opacity/reticulation.  No honeycombing. Similar less pronounced findings are noted involving the periphery of the upper lobes.  No active infiltrate or pleural fluid collection.  The visualized portion of the upper abdominal viscera is unremarkable.  Degenerative spine.    Pulmonary function test: FVC / FEV1 / Ratio / TLC / DLCO  February 2018 (patient unable to follow instructions due to LEP)  35 / 44 / 67 / na / na     ASSESSMENT / PLAN:     Carol Arias is a 79 y.o.  female with:    1. ILD (interstitial lung disease)  - associated with autoantibodies that became negative repeat testing.  - patient with Raynaud's, exam suggestive skin thickening/puffiness distal fingers, abnormal NFC, skin rash LE.  - recommend for blood work as below.  Consider screening for pulmonary hypertension.  - continue management per Pulmonary.  - Immunofixation electrophoresis; Standing  - Protein electrophoresis, serum; Standing  - Urinalysis; Standing  - Protein / creatinine ratio, urine; Standing  - RNA polymerase III Ab, IgG; Future  - MyoMarker Panel 3; Future  - Beta-2 glycoprotein antibodies; Standing  - Cardiolipin antibody; Standing  - DRVVT; Standing  - Aldolase; Future  - CK; Standing  - Lactate dehydrogenase; Future  - Th/To Antibody; Future    2. Rash  - Ambulatory consult to Dermatology  - Consider Skin bx: assess for Ddx for skin thickening    3. Tenosynovitis  - no features of inflammatory arthritis otherwise.  - topical therapy, finger splinting for several weeks.  - consider for corticosteroid injection if refractory.    Follow-up in about 1 month (around 4/13/2019).    Method of contact with patient concerns: [facility]    Disclaimer:  This note is prepared using voice recognition software and as such is likely to have errors and has not been proof read. Please contact me for questions.     Time spent: 60  minutes in face to face discussion concerning diagnosis, prognosis, review of lab and test results, benefits of treatment as well as management of disease, counseling of patient and coordination of care between various health care providers.  Greater than half the time spent was used for coordination of care and counseling of patient.    Brian Rosen M.D.  Rheumatology Department   [facility]

## 2019-04-02 ENCOUNTER — TELEPHONE (OUTPATIENT)
Dept: PULMONOLOGY | Facility: CLINIC | Age: 79
End: 2019-04-02

## 2019-09-10 ENCOUNTER — PATIENT OUTREACH (OUTPATIENT)
Dept: ADMINISTRATIVE | Facility: HOSPITAL | Age: 79
End: 2019-09-10

## 2019-09-10 NOTE — PROGRESS NOTES
I have attempted without success to contact this patient re geovanni an appt for annual visit.  No answer, Left VM.  (1st Attempt)

## 2019-09-30 ENCOUNTER — EXTERNAL CHRONIC CARE MANAGEMENT (OUTPATIENT)
Dept: PRIMARY CARE CLINIC | Facility: CLINIC | Age: 79
End: 2019-09-30
Payer: MEDICARE

## 2019-09-30 PROCEDURE — 99490 PR CHRONIC CARE MGMT, 1ST 20 MIN: ICD-10-PCS | Mod: S$PBB,,, | Performed by: FAMILY MEDICINE

## 2019-09-30 PROCEDURE — 99490 CHRNC CARE MGMT STAFF 1ST 20: CPT | Mod: S$PBB,,, | Performed by: FAMILY MEDICINE

## 2019-09-30 PROCEDURE — 99490 CHRNC CARE MGMT STAFF 1ST 20: CPT | Mod: PBBFAC,PO | Performed by: FAMILY MEDICINE

## 2019-10-31 ENCOUNTER — EXTERNAL CHRONIC CARE MANAGEMENT (OUTPATIENT)
Dept: PRIMARY CARE CLINIC | Facility: CLINIC | Age: 79
End: 2019-10-31
Payer: MEDICARE

## 2019-10-31 PROCEDURE — 99490 CHRNC CARE MGMT STAFF 1ST 20: CPT | Mod: S$PBB,,, | Performed by: FAMILY MEDICINE

## 2019-10-31 PROCEDURE — 99490 PR CHRONIC CARE MGMT, 1ST 20 MIN: ICD-10-PCS | Mod: S$PBB,,, | Performed by: FAMILY MEDICINE

## 2019-10-31 PROCEDURE — 99490 CHRNC CARE MGMT STAFF 1ST 20: CPT | Mod: PBBFAC,PO | Performed by: FAMILY MEDICINE

## 2019-11-26 ENCOUNTER — PATIENT OUTREACH (OUTPATIENT)
Dept: ADMINISTRATIVE | Facility: HOSPITAL | Age: 79
End: 2019-11-26

## 2019-11-30 ENCOUNTER — EXTERNAL CHRONIC CARE MANAGEMENT (OUTPATIENT)
Dept: PRIMARY CARE CLINIC | Facility: CLINIC | Age: 79
End: 2019-11-30
Payer: MEDICARE

## 2019-11-30 PROCEDURE — 99490 CHRNC CARE MGMT STAFF 1ST 20: CPT | Mod: PBBFAC,PO | Performed by: FAMILY MEDICINE

## 2019-11-30 PROCEDURE — 99490 CHRNC CARE MGMT STAFF 1ST 20: CPT | Mod: S$PBB,,, | Performed by: FAMILY MEDICINE

## 2019-11-30 PROCEDURE — 99490 PR CHRONIC CARE MGMT, 1ST 20 MIN: ICD-10-PCS | Mod: S$PBB,,, | Performed by: FAMILY MEDICINE

## 2019-12-31 ENCOUNTER — EXTERNAL CHRONIC CARE MANAGEMENT (OUTPATIENT)
Dept: PRIMARY CARE CLINIC | Facility: CLINIC | Age: 79
End: 2019-12-31
Payer: MEDICARE

## 2019-12-31 PROCEDURE — 99490 CHRNC CARE MGMT STAFF 1ST 20: CPT | Mod: S$PBB,,, | Performed by: FAMILY MEDICINE

## 2019-12-31 PROCEDURE — 99490 PR CHRONIC CARE MGMT, 1ST 20 MIN: ICD-10-PCS | Mod: S$PBB,,, | Performed by: FAMILY MEDICINE

## 2019-12-31 PROCEDURE — 99490 CHRNC CARE MGMT STAFF 1ST 20: CPT | Mod: PBBFAC,PO | Performed by: FAMILY MEDICINE

## 2020-01-01 ENCOUNTER — OFFICE VISIT (OUTPATIENT)
Dept: DERMATOLOGY | Facility: CLINIC | Age: 80
End: 2020-01-01
Payer: MEDICARE

## 2020-01-01 ENCOUNTER — EXTERNAL CHRONIC CARE MANAGEMENT (OUTPATIENT)
Dept: PRIMARY CARE CLINIC | Facility: CLINIC | Age: 80
End: 2020-01-01
Payer: MEDICARE

## 2020-01-01 DIAGNOSIS — L30.9 DERMATITIS: Primary | ICD-10-CM

## 2020-01-01 PROCEDURE — 99202 OFFICE O/P NEW SF 15 MIN: CPT | Mod: 95,,, | Performed by: STUDENT IN AN ORGANIZED HEALTH CARE EDUCATION/TRAINING PROGRAM

## 2020-01-01 PROCEDURE — 99490 CHRNC CARE MGMT STAFF 1ST 20: CPT | Mod: S$PBB,,, | Performed by: FAMILY MEDICINE

## 2020-01-01 PROCEDURE — 99202 PR OFFICE/OUTPT VISIT, NEW, LEVL II, 15-29 MIN: ICD-10-PCS | Mod: 95,,, | Performed by: STUDENT IN AN ORGANIZED HEALTH CARE EDUCATION/TRAINING PROGRAM

## 2020-01-01 PROCEDURE — 99490 CHRNC CARE MGMT STAFF 1ST 20: CPT | Mod: PBBFAC,PO | Performed by: FAMILY MEDICINE

## 2020-01-01 PROCEDURE — 99490 PR CHRONIC CARE MGMT, 1ST 20 MIN: ICD-10-PCS | Mod: S$PBB,,, | Performed by: FAMILY MEDICINE

## 2020-01-01 RX ORDER — CLOBETASOL PROPIONATE 0.5 MG/G
OINTMENT TOPICAL 2 TIMES DAILY
Qty: 60 G | Refills: 1 | Status: SHIPPED | OUTPATIENT
Start: 2020-01-01

## 2020-01-01 RX ORDER — UREA 40 %
CREAM (GRAM) TOPICAL 2 TIMES DAILY
Qty: 85 G | Refills: 1 | Status: SHIPPED | OUTPATIENT
Start: 2020-01-01

## 2020-01-31 ENCOUNTER — EXTERNAL CHRONIC CARE MANAGEMENT (OUTPATIENT)
Dept: PRIMARY CARE CLINIC | Facility: CLINIC | Age: 80
End: 2020-01-31
Payer: MEDICARE

## 2020-01-31 PROCEDURE — 99490 PR CHRONIC CARE MGMT, 1ST 20 MIN: ICD-10-PCS | Mod: S$PBB,,, | Performed by: FAMILY MEDICINE

## 2020-01-31 PROCEDURE — 99490 CHRNC CARE MGMT STAFF 1ST 20: CPT | Mod: S$PBB,,, | Performed by: FAMILY MEDICINE

## 2020-01-31 PROCEDURE — 99490 CHRNC CARE MGMT STAFF 1ST 20: CPT | Mod: PBBFAC,PO | Performed by: FAMILY MEDICINE

## 2020-02-29 ENCOUNTER — EXTERNAL CHRONIC CARE MANAGEMENT (OUTPATIENT)
Dept: PRIMARY CARE CLINIC | Facility: CLINIC | Age: 80
End: 2020-02-29
Payer: MEDICARE

## 2020-02-29 PROCEDURE — 99490 PR CHRONIC CARE MGMT, 1ST 20 MIN: ICD-10-PCS | Mod: S$PBB,,, | Performed by: FAMILY MEDICINE

## 2020-02-29 PROCEDURE — 99490 CHRNC CARE MGMT STAFF 1ST 20: CPT | Mod: S$PBB,,, | Performed by: FAMILY MEDICINE

## 2020-02-29 PROCEDURE — 99490 CHRNC CARE MGMT STAFF 1ST 20: CPT | Mod: PBBFAC,PO | Performed by: FAMILY MEDICINE

## 2020-03-31 ENCOUNTER — EXTERNAL CHRONIC CARE MANAGEMENT (OUTPATIENT)
Dept: PRIMARY CARE CLINIC | Facility: CLINIC | Age: 80
End: 2020-03-31
Payer: MEDICARE

## 2020-03-31 PROCEDURE — 99490 CHRNC CARE MGMT STAFF 1ST 20: CPT | Mod: S$PBB,,, | Performed by: FAMILY MEDICINE

## 2020-03-31 PROCEDURE — 99490 CHRNC CARE MGMT STAFF 1ST 20: CPT | Mod: PBBFAC,PO | Performed by: FAMILY MEDICINE

## 2020-03-31 PROCEDURE — 99490 PR CHRONIC CARE MGMT, 1ST 20 MIN: ICD-10-PCS | Mod: S$PBB,,, | Performed by: FAMILY MEDICINE

## 2020-04-30 ENCOUNTER — EXTERNAL CHRONIC CARE MANAGEMENT (OUTPATIENT)
Dept: PRIMARY CARE CLINIC | Facility: CLINIC | Age: 80
End: 2020-04-30
Payer: MEDICARE

## 2020-04-30 PROCEDURE — 99490 CHRNC CARE MGMT STAFF 1ST 20: CPT | Mod: S$PBB,,, | Performed by: FAMILY MEDICINE

## 2020-04-30 PROCEDURE — 99490 CHRNC CARE MGMT STAFF 1ST 20: CPT | Mod: PBBFAC,PO | Performed by: FAMILY MEDICINE

## 2020-04-30 PROCEDURE — 99490 PR CHRONIC CARE MGMT, 1ST 20 MIN: ICD-10-PCS | Mod: S$PBB,,, | Performed by: FAMILY MEDICINE

## 2020-05-31 ENCOUNTER — EXTERNAL CHRONIC CARE MANAGEMENT (OUTPATIENT)
Dept: PRIMARY CARE CLINIC | Facility: CLINIC | Age: 80
End: 2020-05-31
Payer: MEDICARE

## 2020-05-31 PROCEDURE — 99490 CHRNC CARE MGMT STAFF 1ST 20: CPT | Mod: S$PBB,,, | Performed by: FAMILY MEDICINE

## 2020-05-31 PROCEDURE — 99490 CHRNC CARE MGMT STAFF 1ST 20: CPT | Mod: PBBFAC,PO | Performed by: FAMILY MEDICINE

## 2020-05-31 PROCEDURE — 99490 PR CHRONIC CARE MGMT, 1ST 20 MIN: ICD-10-PCS | Mod: S$PBB,,, | Performed by: FAMILY MEDICINE

## 2020-06-30 ENCOUNTER — EXTERNAL CHRONIC CARE MANAGEMENT (OUTPATIENT)
Dept: PRIMARY CARE CLINIC | Facility: CLINIC | Age: 80
End: 2020-06-30
Payer: MEDICARE

## 2020-06-30 PROCEDURE — 99490 PR CHRONIC CARE MGMT, 1ST 20 MIN: ICD-10-PCS | Mod: S$PBB,,, | Performed by: FAMILY MEDICINE

## 2020-06-30 PROCEDURE — 99490 CHRNC CARE MGMT STAFF 1ST 20: CPT | Mod: PBBFAC,PO | Performed by: FAMILY MEDICINE

## 2020-06-30 PROCEDURE — 99490 CHRNC CARE MGMT STAFF 1ST 20: CPT | Mod: S$PBB,,, | Performed by: FAMILY MEDICINE

## 2020-07-31 ENCOUNTER — EXTERNAL CHRONIC CARE MANAGEMENT (OUTPATIENT)
Dept: PRIMARY CARE CLINIC | Facility: CLINIC | Age: 80
End: 2020-07-31
Payer: MEDICARE

## 2020-07-31 PROCEDURE — 99490 CHRNC CARE MGMT STAFF 1ST 20: CPT | Mod: S$PBB,,, | Performed by: FAMILY MEDICINE

## 2020-07-31 PROCEDURE — 99490 CHRNC CARE MGMT STAFF 1ST 20: CPT | Mod: PBBFAC,PO | Performed by: FAMILY MEDICINE

## 2020-07-31 PROCEDURE — 99490 PR CHRONIC CARE MGMT, 1ST 20 MIN: ICD-10-PCS | Mod: S$PBB,,, | Performed by: FAMILY MEDICINE

## 2020-12-16 NOTE — PROGRESS NOTES
The patient location is: home  The chief complaint leading to consultation is: rash on leg    Visit type: audiovisual    Face to Face time with patient: 10mins  10 minutes of total time spent on the encounter, which includes face to face time and non-face to face time preparing to see the patient (eg, review of tests), Obtaining and/or reviewing separately obtained history, Documenting clinical information in the electronic or other health record, Independently interpreting results (not separately reported) and communicating results to the patient/family/caregiver, or Care coordination (not separately reported).         Each patient to whom he or she provides medical services by telemedicine is:  (1) informed of the relationship between the physician and patient and the respective role of any other health care provider with respect to management of the patient; and (2) notified that he or she may decline to receive medical services by telemedicine and may withdraw from such care at any time.    Notes: Patient presents with caregiver and  for evaluation of a rash on the left anterior leg. Ongoing for several months, complains of thickening dryness and redness of the entire front part of the leg, now with some on the right anterior leg. Have not tried anything for symptoms.     ROS: Otherwise negative     PE: Const/Neuro - AAOx3, NAD          Skin - lichenified hypertrophic erythematous plaque along entire anterior leg    A/P: 1) Dermatitis - clobetasol 0.05% ointment and urea 40% cream, both BID. Discussed with caregiver that she may require a biopsy if symptoms do not improve (she has a history of interstitial lung disease and are both nervous about coming to the doctors office due to covid-19).

## 2021-01-01 ENCOUNTER — IMMUNIZATION (OUTPATIENT)
Dept: INTERNAL MEDICINE | Facility: CLINIC | Age: 81
End: 2021-01-01
Payer: MEDICARE

## 2021-01-01 ENCOUNTER — EXTERNAL CHRONIC CARE MANAGEMENT (OUTPATIENT)
Dept: PRIMARY CARE CLINIC | Facility: CLINIC | Age: 81
End: 2021-01-01
Payer: MEDICARE

## 2021-01-01 ENCOUNTER — HOSPITAL ENCOUNTER (EMERGENCY)
Facility: HOSPITAL | Age: 81
End: 2021-09-11
Attending: FAMILY MEDICINE
Payer: MEDICARE

## 2021-01-01 ENCOUNTER — PATIENT MESSAGE (OUTPATIENT)
Dept: DERMATOLOGY | Facility: CLINIC | Age: 81
End: 2021-01-01

## 2021-01-01 ENCOUNTER — PES CALL (OUTPATIENT)
Dept: ADMINISTRATIVE | Facility: CLINIC | Age: 81
End: 2021-01-01

## 2021-01-01 VITALS — WEIGHT: 100 LBS | TEMPERATURE: 97 F | HEIGHT: 62 IN | BODY MASS INDEX: 18.4 KG/M2

## 2021-01-01 DIAGNOSIS — R41.89 UNRESPONSIVE: ICD-10-CM

## 2021-01-01 DIAGNOSIS — Z23 NEED FOR VACCINATION: Primary | ICD-10-CM

## 2021-01-01 DIAGNOSIS — I46.9 CARDIAC ARREST: Primary | ICD-10-CM

## 2021-01-01 PROCEDURE — 99490 PR CHRONIC CARE MGMT, 1ST 20 MIN: ICD-10-PCS | Mod: S$PBB,,, | Performed by: FAMILY MEDICINE

## 2021-01-01 PROCEDURE — 0002A COVID-19, MRNA, LNP-S, PF, 30 MCG/0.3 ML DOSE VACCINE: CPT | Mod: PBBFAC | Performed by: FAMILY MEDICINE

## 2021-01-01 PROCEDURE — 91300 COVID-19, MRNA, LNP-S, PF, 30 MCG/0.3 ML DOSE VACCINE: CPT | Mod: PBBFAC | Performed by: FAMILY MEDICINE

## 2021-01-01 PROCEDURE — 82962 GLUCOSE BLOOD TEST: CPT

## 2021-01-01 PROCEDURE — 99291 CRITICAL CARE FIRST HOUR: CPT | Mod: 25

## 2021-01-01 PROCEDURE — 99490 CHRNC CARE MGMT STAFF 1ST 20: CPT | Mod: S$PBB,,, | Performed by: FAMILY MEDICINE

## 2021-01-01 PROCEDURE — 92950 HEART/LUNG RESUSCITATION CPR: CPT

## 2021-01-01 PROCEDURE — 99490 CHRNC CARE MGMT STAFF 1ST 20: CPT | Mod: PBBFAC,PO | Performed by: FAMILY MEDICINE

## 2021-01-01 PROCEDURE — 25000003 PHARM REV CODE 250: Performed by: FAMILY MEDICINE

## 2021-01-01 PROCEDURE — 63600175 PHARM REV CODE 636 W HCPCS: Performed by: FAMILY MEDICINE

## 2021-01-01 RX ORDER — SODIUM BICARBONATE 1 MEQ/ML
SYRINGE (ML) INTRAVENOUS CODE/TRAUMA/SEDATION MEDICATION
Status: COMPLETED | OUTPATIENT
Start: 2021-01-01 | End: 2021-01-01

## 2021-01-01 RX ORDER — EPINEPHRINE 0.1 MG/ML
INJECTION INTRAVENOUS CODE/TRAUMA/SEDATION MEDICATION
Status: COMPLETED | OUTPATIENT
Start: 2021-01-01 | End: 2021-01-01

## 2021-01-01 RX ORDER — CALCIUM CHLORIDE INJECTION 100 MG/ML
INJECTION, SOLUTION INTRAVENOUS CODE/TRAUMA/SEDATION MEDICATION
Status: COMPLETED | OUTPATIENT
Start: 2021-01-01 | End: 2021-01-01

## 2021-01-01 RX ADMIN — EPINEPHRINE 1 MG: 0.1 INJECTION, SOLUTION ENDOTRACHEAL; INTRACARDIAC; INTRAVENOUS at 08:09

## 2021-01-01 RX ADMIN — EPINEPHRINE 1 MG: 0.1 INJECTION, SOLUTION ENDOTRACHEAL; INTRACARDIAC; INTRAVENOUS at 09:09

## 2021-01-01 RX ADMIN — SODIUM BICARBONATE 50 MEQ: 84 INJECTION, SOLUTION INTRAVENOUS at 08:09

## 2021-01-01 RX ADMIN — CALCIUM CHLORIDE 1 G: 100 INJECTION INTRAVENOUS; INTRAVENTRICULAR at 08:09

## 2021-01-01 RX ADMIN — SODIUM BICARBONATE 50 MEQ: 84 INJECTION, SOLUTION INTRAVENOUS at 09:09

## 2021-09-15 LAB — POCT GLUCOSE: 115 MG/DL (ref 70–110)
